# Patient Record
Sex: FEMALE | Race: WHITE | Employment: FULL TIME | URBAN - METROPOLITAN AREA
[De-identification: names, ages, dates, MRNs, and addresses within clinical notes are randomized per-mention and may not be internally consistent; named-entity substitution may affect disease eponyms.]

---

## 2017-01-24 RX ORDER — FAMCICLOVIR 500 MG/1
TABLET, FILM COATED ORAL
Qty: 30 TABLET | Refills: 3 | Status: SHIPPED | OUTPATIENT
Start: 2017-01-24 | End: 2017-03-02 | Stop reason: SDUPTHER

## 2017-03-02 RX ORDER — FAMCICLOVIR 500 MG/1
TABLET, FILM COATED ORAL
Qty: 30 TABLET | Refills: 0 | Status: SHIPPED | OUTPATIENT
Start: 2017-03-02 | End: 2018-05-16 | Stop reason: SDUPTHER

## 2017-03-02 RX ORDER — BUPROPION HYDROCHLORIDE 150 MG/1
150 TABLET, EXTENDED RELEASE ORAL 2 TIMES DAILY
Qty: 60 TABLET | Refills: 5 | Status: SHIPPED | OUTPATIENT
Start: 2017-03-02 | End: 2017-05-02 | Stop reason: SDUPTHER

## 2017-05-02 RX ORDER — BUPROPION HYDROCHLORIDE 150 MG/1
150 TABLET, EXTENDED RELEASE ORAL 2 TIMES DAILY
Qty: 60 TABLET | Refills: 5 | Status: SHIPPED | OUTPATIENT
Start: 2017-05-02 | End: 2018-01-30 | Stop reason: SDUPTHER

## 2018-01-02 ENCOUNTER — TELEPHONE (OUTPATIENT)
Dept: INTERNAL MEDICINE CLINIC | Age: 38
End: 2018-01-02

## 2018-01-02 DIAGNOSIS — M54.50 ACUTE MIDLINE LOW BACK PAIN WITHOUT SCIATICA: Primary | ICD-10-CM

## 2018-01-30 RX ORDER — BUPROPION HYDROCHLORIDE 150 MG/1
150 TABLET, EXTENDED RELEASE ORAL 2 TIMES DAILY
Qty: 60 TABLET | Refills: 5 | Status: SHIPPED | OUTPATIENT
Start: 2018-01-30 | End: 2018-02-12 | Stop reason: CLARIF

## 2018-01-30 NOTE — TELEPHONE ENCOUNTER
Chanel Seema called and wanted to know if you could change her Wellbutrin SR to Wellbutrin XL so she only has to take it once a day.     She will need 2 rx's one for 30 days at [facility] and one sent to [facility]

## 2018-02-06 ENCOUNTER — TELEPHONE (OUTPATIENT)
Dept: INTERNAL MEDICINE CLINIC | Age: 38
End: 2018-02-06

## 2018-02-06 DIAGNOSIS — Z00.00 ANNUAL PHYSICAL EXAM: Primary | ICD-10-CM

## 2018-02-06 DIAGNOSIS — E55.9 VITAMIN D DEFICIENCY: ICD-10-CM

## 2018-02-12 ENCOUNTER — OFFICE VISIT (OUTPATIENT)
Dept: INTERNAL MEDICINE CLINIC | Age: 38
End: 2018-02-12

## 2018-02-12 VITALS
HEART RATE: 72 BPM | WEIGHT: 151.4 LBS | SYSTOLIC BLOOD PRESSURE: 110 MMHG | BODY MASS INDEX: 24.33 KG/M2 | HEIGHT: 66 IN | DIASTOLIC BLOOD PRESSURE: 70 MMHG

## 2018-02-12 DIAGNOSIS — E55.9 VITAMIN D DEFICIENCY: ICD-10-CM

## 2018-02-12 DIAGNOSIS — Z00.00 ANNUAL PHYSICAL EXAM: Primary | ICD-10-CM

## 2018-02-12 DIAGNOSIS — Z00.00 ANNUAL PHYSICAL EXAM: ICD-10-CM

## 2018-02-12 LAB
A/G RATIO: 1.6 (ref 1.1–2.2)
ALBUMIN SERPL-MCNC: 4.3 G/DL (ref 3.4–5)
ALP BLD-CCNC: 59 U/L (ref 40–129)
ALT SERPL-CCNC: 14 U/L (ref 10–40)
ANION GAP SERPL CALCULATED.3IONS-SCNC: 12 MMOL/L (ref 3–16)
AST SERPL-CCNC: 17 U/L (ref 15–37)
BACTERIA: ABNORMAL /HPF
BASOPHILS ABSOLUTE: 0 K/UL (ref 0–0.2)
BASOPHILS RELATIVE PERCENT: 0.5 %
BILIRUB SERPL-MCNC: 0.4 MG/DL (ref 0–1)
BILIRUBIN URINE: NEGATIVE
BLOOD, URINE: NEGATIVE
BUN BLDV-MCNC: 10 MG/DL (ref 7–20)
CALCIUM SERPL-MCNC: 9.3 MG/DL (ref 8.3–10.6)
CHLORIDE BLD-SCNC: 102 MMOL/L (ref 99–110)
CHOLESTEROL, TOTAL: 182 MG/DL (ref 0–199)
CLARITY: CLEAR
CO2: 27 MMOL/L (ref 21–32)
COLOR: YELLOW
CREAT SERPL-MCNC: 0.8 MG/DL (ref 0.6–1.1)
EOSINOPHILS ABSOLUTE: 0.2 K/UL (ref 0–0.6)
EOSINOPHILS RELATIVE PERCENT: 2.6 %
EPITHELIAL CELLS, UA: 2 /HPF (ref 0–5)
GFR AFRICAN AMERICAN: >60
GFR NON-AFRICAN AMERICAN: >60
GLOBULIN: 2.7 G/DL
GLUCOSE BLD-MCNC: 87 MG/DL (ref 70–99)
GLUCOSE URINE: NEGATIVE MG/DL
HCT VFR BLD CALC: 37.3 % (ref 36–48)
HDLC SERPL-MCNC: 76 MG/DL (ref 40–60)
HEMOGLOBIN: 12.8 G/DL (ref 12–16)
HYALINE CASTS: 0 /LPF (ref 0–8)
KETONES, URINE: NEGATIVE MG/DL
LDL CHOLESTEROL CALCULATED: 93 MG/DL
LEUKOCYTE ESTERASE, URINE: NEGATIVE
LYMPHOCYTES ABSOLUTE: 1.3 K/UL (ref 1–5.1)
LYMPHOCYTES RELATIVE PERCENT: 19 %
MCH RBC QN AUTO: 32.3 PG (ref 26–34)
MCHC RBC AUTO-ENTMCNC: 34.2 G/DL (ref 31–36)
MCV RBC AUTO: 94.4 FL (ref 80–100)
MICROSCOPIC EXAMINATION: ABNORMAL
MONOCYTES ABSOLUTE: 0.5 K/UL (ref 0–1.3)
MONOCYTES RELATIVE PERCENT: 6.7 %
NEUTROPHILS ABSOLUTE: 4.9 K/UL (ref 1.7–7.7)
NEUTROPHILS RELATIVE PERCENT: 71.2 %
NITRITE, URINE: NEGATIVE
PDW BLD-RTO: 12.9 % (ref 12.4–15.4)
PH UA: 6.5
PLATELET # BLD: 269 K/UL (ref 135–450)
PMV BLD AUTO: 8.7 FL (ref 5–10.5)
POTASSIUM SERPL-SCNC: 4.8 MMOL/L (ref 3.5–5.1)
PROTEIN UA: NEGATIVE MG/DL
RBC # BLD: 3.95 M/UL (ref 4–5.2)
RBC UA: 3 /HPF (ref 0–4)
SODIUM BLD-SCNC: 141 MMOL/L (ref 136–145)
SPECIFIC GRAVITY UA: 1.01
TOTAL PROTEIN: 7 G/DL (ref 6.4–8.2)
TRIGL SERPL-MCNC: 66 MG/DL (ref 0–150)
TSH SERPL DL<=0.05 MIU/L-ACNC: 1.69 UIU/ML (ref 0.27–4.2)
UROBILINOGEN, URINE: 0.2 E.U./DL
VITAMIN D 25-HYDROXY: 38.8 NG/ML
VLDLC SERPL CALC-MCNC: 13 MG/DL
WBC # BLD: 6.9 K/UL (ref 4–11)
WBC UA: 0 /HPF (ref 0–5)

## 2018-02-12 PROCEDURE — 99395 PREV VISIT EST AGE 18-39: CPT | Performed by: INTERNAL MEDICINE

## 2018-02-12 RX ORDER — BUPROPION HYDROCHLORIDE 300 MG/1
300 TABLET ORAL EVERY MORNING
Qty: 30 TABLET | Refills: 5 | Status: SHIPPED | OUTPATIENT
Start: 2018-02-12 | End: 2018-10-01 | Stop reason: SDUPTHER

## 2018-02-12 ASSESSMENT — PATIENT HEALTH QUESTIONNAIRE - PHQ9
SUM OF ALL RESPONSES TO PHQ9 QUESTIONS 1 & 2: 2
2. FEELING DOWN, DEPRESSED OR HOPELESS: 1
SUM OF ALL RESPONSES TO PHQ QUESTIONS 1-9: 2
1. LITTLE INTEREST OR PLEASURE IN DOING THINGS: 1

## 2018-02-13 LAB
ESTIMATED AVERAGE GLUCOSE: 79.6 MG/DL
HBA1C MFR BLD: 4.4 %

## 2018-05-16 RX ORDER — FAMCICLOVIR 500 MG/1
TABLET, FILM COATED ORAL
Qty: 30 TABLET | Refills: 0 | Status: SHIPPED | OUTPATIENT
Start: 2018-05-16 | End: 2019-03-12 | Stop reason: SDUPTHER

## 2018-10-03 RX ORDER — BUPROPION HYDROCHLORIDE 300 MG/1
TABLET ORAL
Qty: 30 TABLET | Refills: 4 | Status: SHIPPED | OUTPATIENT
Start: 2018-10-03 | End: 2019-03-12 | Stop reason: SDUPTHER

## 2018-12-04 ENCOUNTER — TELEPHONE (OUTPATIENT)
Dept: INTERNAL MEDICINE CLINIC | Age: 38
End: 2018-12-04

## 2018-12-04 RX ORDER — AZITHROMYCIN 250 MG/1
TABLET, FILM COATED ORAL
Qty: 1 PACKET | Refills: 0 | Status: SHIPPED | OUTPATIENT
Start: 2018-12-04 | End: 2018-12-14

## 2019-01-08 ENCOUNTER — TELEPHONE (OUTPATIENT)
Dept: INTERNAL MEDICINE CLINIC | Age: 39
End: 2019-01-08

## 2019-01-08 RX ORDER — AMOXICILLIN AND CLAVULANATE POTASSIUM 875; 125 MG/1; MG/1
1 TABLET, FILM COATED ORAL 2 TIMES DAILY
Qty: 20 TABLET | Refills: 0 | Status: SHIPPED | OUTPATIENT
Start: 2019-01-08 | End: 2019-01-18

## 2019-03-12 ENCOUNTER — OFFICE VISIT (OUTPATIENT)
Dept: INTERNAL MEDICINE CLINIC | Age: 39
End: 2019-03-12
Payer: COMMERCIAL

## 2019-03-12 VITALS
WEIGHT: 154.2 LBS | DIASTOLIC BLOOD PRESSURE: 68 MMHG | BODY MASS INDEX: 24.78 KG/M2 | HEIGHT: 66 IN | SYSTOLIC BLOOD PRESSURE: 110 MMHG | HEART RATE: 88 BPM

## 2019-03-12 DIAGNOSIS — Z23 NEED FOR PROPHYLACTIC VACCINATION AGAINST DIPHTHERIA-TETANUS-PERTUSSIS (DTP): ICD-10-CM

## 2019-03-12 DIAGNOSIS — Z12.83 SKIN CANCER SCREENING: ICD-10-CM

## 2019-03-12 DIAGNOSIS — Z00.00 ANNUAL PHYSICAL EXAM: Primary | ICD-10-CM

## 2019-03-12 LAB
A/G RATIO: 1.8 (ref 1.1–2.2)
ALBUMIN SERPL-MCNC: 4.8 G/DL (ref 3.4–5)
ALP BLD-CCNC: 55 U/L (ref 40–129)
ALT SERPL-CCNC: 15 U/L (ref 10–40)
ANION GAP SERPL CALCULATED.3IONS-SCNC: 13 MMOL/L (ref 3–16)
AST SERPL-CCNC: 19 U/L (ref 15–37)
BASOPHILS ABSOLUTE: 0 K/UL (ref 0–0.2)
BASOPHILS RELATIVE PERCENT: 0.4 %
BILIRUB SERPL-MCNC: 0.6 MG/DL (ref 0–1)
BILIRUBIN URINE: NEGATIVE
BLOOD, URINE: NEGATIVE
BUN BLDV-MCNC: 11 MG/DL (ref 7–20)
CALCIUM SERPL-MCNC: 9.4 MG/DL (ref 8.3–10.6)
CHLORIDE BLD-SCNC: 101 MMOL/L (ref 99–110)
CHOLESTEROL, TOTAL: 197 MG/DL (ref 0–199)
CLARITY: CLEAR
CO2: 26 MMOL/L (ref 21–32)
COLOR: YELLOW
CREAT SERPL-MCNC: 0.6 MG/DL (ref 0.6–1.1)
EOSINOPHILS ABSOLUTE: 0.1 K/UL (ref 0–0.6)
EOSINOPHILS RELATIVE PERCENT: 2.1 %
EPITHELIAL CELLS, UA: 2 /HPF (ref 0–5)
GFR AFRICAN AMERICAN: >60
GFR NON-AFRICAN AMERICAN: >60
GLOBULIN: 2.6 G/DL
GLUCOSE BLD-MCNC: 102 MG/DL (ref 70–99)
GLUCOSE URINE: NEGATIVE MG/DL
HCT VFR BLD CALC: 40.3 % (ref 36–48)
HDLC SERPL-MCNC: 76 MG/DL (ref 40–60)
HEMOGLOBIN: 13.6 G/DL (ref 12–16)
HYALINE CASTS: 1 /LPF (ref 0–8)
KETONES, URINE: NEGATIVE MG/DL
LDL CHOLESTEROL CALCULATED: 104 MG/DL
LEUKOCYTE ESTERASE, URINE: NEGATIVE
LYMPHOCYTES ABSOLUTE: 1.9 K/UL (ref 1–5.1)
LYMPHOCYTES RELATIVE PERCENT: 26.8 %
MCH RBC QN AUTO: 32 PG (ref 26–34)
MCHC RBC AUTO-ENTMCNC: 33.8 G/DL (ref 31–36)
MCV RBC AUTO: 94.9 FL (ref 80–100)
MICROSCOPIC EXAMINATION: NORMAL
MONOCYTES ABSOLUTE: 0.6 K/UL (ref 0–1.3)
MONOCYTES RELATIVE PERCENT: 7.7 %
NEUTROPHILS ABSOLUTE: 4.6 K/UL (ref 1.7–7.7)
NEUTROPHILS RELATIVE PERCENT: 63 %
NITRITE, URINE: NEGATIVE
PDW BLD-RTO: 12.6 % (ref 12.4–15.4)
PH UA: 6 (ref 5–8)
PLATELET # BLD: 294 K/UL (ref 135–450)
PMV BLD AUTO: 9.7 FL (ref 5–10.5)
POTASSIUM SERPL-SCNC: 4.1 MMOL/L (ref 3.5–5.1)
PROTEIN UA: NEGATIVE MG/DL
RBC # BLD: 4.25 M/UL (ref 4–5.2)
RBC UA: NORMAL /HPF (ref 0–2)
SODIUM BLD-SCNC: 140 MMOL/L (ref 136–145)
SPECIFIC GRAVITY UA: 1.02 (ref 1–1.03)
TOTAL PROTEIN: 7.4 G/DL (ref 6.4–8.2)
TRIGL SERPL-MCNC: 86 MG/DL (ref 0–150)
TSH SERPL DL<=0.05 MIU/L-ACNC: 1.55 UIU/ML (ref 0.27–4.2)
UROBILINOGEN, URINE: 0.2 E.U./DL
VLDLC SERPL CALC-MCNC: 17 MG/DL
WBC # BLD: 7.2 K/UL (ref 4–11)
WBC UA: 1 /HPF (ref 0–5)

## 2019-03-12 PROCEDURE — 81001 URINALYSIS AUTO W/SCOPE: CPT | Performed by: INTERNAL MEDICINE

## 2019-03-12 PROCEDURE — 99395 PREV VISIT EST AGE 18-39: CPT | Performed by: INTERNAL MEDICINE

## 2019-03-12 PROCEDURE — 90715 TDAP VACCINE 7 YRS/> IM: CPT | Performed by: INTERNAL MEDICINE

## 2019-03-12 PROCEDURE — 90471 IMMUNIZATION ADMIN: CPT | Performed by: INTERNAL MEDICINE

## 2019-03-12 RX ORDER — FAMCICLOVIR 500 MG/1
TABLET, FILM COATED ORAL
Qty: 9 TABLET | Refills: 5 | Status: SHIPPED | OUTPATIENT
Start: 2019-03-12 | End: 2019-12-16 | Stop reason: SDUPTHER

## 2019-03-12 RX ORDER — BUPROPION HYDROCHLORIDE 300 MG/1
TABLET ORAL
Qty: 90 TABLET | Refills: 3 | Status: SHIPPED | OUTPATIENT
Start: 2019-03-12 | End: 2019-07-08 | Stop reason: SDUPTHER

## 2019-03-12 ASSESSMENT — PATIENT HEALTH QUESTIONNAIRE - PHQ9
2. FEELING DOWN, DEPRESSED OR HOPELESS: 0
SUM OF ALL RESPONSES TO PHQ QUESTIONS 1-9: 0
1. LITTLE INTEREST OR PLEASURE IN DOING THINGS: 0
SUM OF ALL RESPONSES TO PHQ9 QUESTIONS 1 & 2: 0
SUM OF ALL RESPONSES TO PHQ QUESTIONS 1-9: 0

## 2019-05-14 ENCOUNTER — TELEPHONE (OUTPATIENT)
Dept: INTERNAL MEDICINE CLINIC | Age: 39
End: 2019-05-14

## 2019-05-14 NOTE — TELEPHONE ENCOUNTER
Patient left message stating that she is currently on Wellbutrin 300mg and she would like to try to wean herself off. She is wanting PCP's suggestion. Also when c/b to pt we need to give her the number to Dr Aleena Landa; they never called her to schedule.  [phone]

## 2019-05-16 NOTE — TELEPHONE ENCOUNTER
Can change to Wellbutrin  mg 1 qday #30 RX1 and call results after two weeks. OK to give Dr Ana Paula Issa phone #, can change to Dr Marcos Villareal in [place] if no luck in contacting.

## 2019-06-21 ENCOUNTER — TELEPHONE (OUTPATIENT)
Dept: INTERNAL MEDICINE CLINIC | Age: 39
End: 2019-06-21

## 2019-06-21 NOTE — TELEPHONE ENCOUNTER
Patient left message stating she has injured her left lower leg somehow. She stated she walks 6.5 miles 6 days a week. She stated she knows she has shin splints in both legs but her right leg is not nearly as bad as her left. Also in her left leg; she's having calf pain. She state there is no swelling to her calf and it is not warm to touch. She has been trying Ibuprofen and a gel on her leg but nothing seems to be helping. She is wanting PCP's advice. Please advise.

## 2019-07-08 RX ORDER — BUPROPION HYDROCHLORIDE 150 MG/1
TABLET ORAL
Qty: 90 TABLET | Refills: 1 | Status: SHIPPED | OUTPATIENT
Start: 2019-07-08 | End: 2019-12-16 | Stop reason: SDUPTHER

## 2019-07-08 NOTE — TELEPHONE ENCOUNTER
Last time Rey La was in you told her she could decrease her Wellbutrin to 150mg.   She would like this rx called in for 90 days to [facility]

## 2019-09-05 ENCOUNTER — TELEPHONE (OUTPATIENT)
Dept: INTERNAL MEDICINE CLINIC | Age: 39
End: 2019-09-05

## 2019-09-05 RX ORDER — HYDROXYZINE HYDROCHLORIDE 10 MG/1
TABLET, FILM COATED ORAL
Qty: 50 TABLET | Refills: 1 | Status: SHIPPED | OUTPATIENT
Start: 2019-09-05 | End: 2020-08-10

## 2019-09-06 ENCOUNTER — TELEPHONE (OUTPATIENT)
Dept: INTERNAL MEDICINE CLINIC | Age: 39
End: 2019-09-06

## 2019-09-06 DIAGNOSIS — Z12.83 SKIN CANCER SCREENING: Primary | ICD-10-CM

## 2019-09-09 NOTE — TELEPHONE ENCOUNTER
She can change the Wellbutrin  mg to every other day for 10 days then stop.     Refer to Masha Quijano with Dr Papo Mueller

## 2019-11-06 ENCOUNTER — TELEPHONE (OUTPATIENT)
Dept: INTERNAL MEDICINE CLINIC | Age: 39
End: 2019-11-06

## 2019-12-16 RX ORDER — BUPROPION HYDROCHLORIDE 300 MG/1
TABLET ORAL
Qty: 90 TABLET | Refills: 3 | Status: SHIPPED | OUTPATIENT
Start: 2019-12-16 | End: 2020-08-25

## 2019-12-16 RX ORDER — FAMCICLOVIR 500 MG/1
TABLET, FILM COATED ORAL
Qty: 9 TABLET | Refills: 5 | Status: SHIPPED | OUTPATIENT
Start: 2019-12-16 | End: 2020-09-24 | Stop reason: SDUPTHER

## 2020-05-07 ENCOUNTER — HOSPITAL ENCOUNTER (OUTPATIENT)
Age: 40
Setting detail: SPECIMEN
Discharge: HOME | End: 2020-05-07

## 2020-05-07 PROCEDURE — U0002 COVID-19 LAB TEST NON-CDC: HCPCS

## 2020-05-10 LAB
SARS-COV-2: NOT DETECTED
SOURCE: NORMAL

## 2020-08-10 ENCOUNTER — HOSPITAL ENCOUNTER (OUTPATIENT)
Age: 40
Discharge: HOME | End: 2020-08-10
Payer: COMMERCIAL

## 2020-08-10 ENCOUNTER — TELEPHONE (OUTPATIENT)
Dept: FAMILY MEDICINE CLINIC | Age: 40
End: 2020-08-10

## 2020-08-10 ENCOUNTER — HOSPITAL ENCOUNTER (OUTPATIENT)
Dept: GENERAL RADIOLOGY | Age: 40
Discharge: HOME | End: 2020-08-10
Payer: COMMERCIAL

## 2020-08-10 ENCOUNTER — OFFICE VISIT (OUTPATIENT)
Dept: FAMILY MEDICINE CLINIC | Age: 40
End: 2020-08-10
Payer: COMMERCIAL

## 2020-08-10 VITALS
WEIGHT: 154.4 LBS | HEART RATE: 80 BPM | SYSTOLIC BLOOD PRESSURE: 108 MMHG | BODY MASS INDEX: 24.92 KG/M2 | TEMPERATURE: 96.9 F | DIASTOLIC BLOOD PRESSURE: 78 MMHG | OXYGEN SATURATION: 100 %

## 2020-08-10 LAB — D DIMER: <200 NG/ML(DDU)

## 2020-08-10 PROCEDURE — 36415 COLL VENOUS BLD VENIPUNCTURE: CPT

## 2020-08-10 PROCEDURE — 85379 FIBRIN DEGRADATION QUANT: CPT

## 2020-08-10 PROCEDURE — 93000 ELECTROCARDIOGRAM COMPLETE: CPT | Performed by: NURSE PRACTITIONER

## 2020-08-10 PROCEDURE — 71101 X-RAY EXAM UNILAT RIBS/CHEST: CPT

## 2020-08-10 PROCEDURE — 99213 OFFICE O/P EST LOW 20 MIN: CPT | Performed by: NURSE PRACTITIONER

## 2020-08-10 RX ORDER — NAPROXEN 500 MG/1
500 TABLET ORAL 2 TIMES DAILY WITH MEALS
Qty: 60 TABLET | Refills: 0 | Status: SHIPPED | OUTPATIENT
Start: 2020-08-10 | End: 2020-08-25

## 2020-08-10 SDOH — HEALTH STABILITY: MENTAL HEALTH: HOW OFTEN DO YOU HAVE A DRINK CONTAINING ALCOHOL?: MONTHLY OR LESS

## 2020-08-10 ASSESSMENT — ENCOUNTER SYMPTOMS
VOMITING: 0
SHORTNESS OF BREATH: 0
WHEEZING: 0
CHEST TIGHTNESS: 0
NAUSEA: 0
DIARRHEA: 0
COUGH: 0

## 2020-08-10 NOTE — PROGRESS NOTES
Component Value Date    LDLCALC 104 (H) 03/12/2019    LDLCALC 93 02/12/2018     Lab Results   Component Value Date    LABA1C 4.4 02/12/2018     Lab Results   Component Value Date    TSH 1.55 03/12/2019    TSHHS 1.470 08/07/2014         ASSESSMENT/PLAN:      1. Chest pain, unspecified type  EKG showed normal sinus rhythm. Likely musculoskeletal.  Will order x-ray of the ribs to include the chest.  Will check D-Dimer. Advised patient if any of her symptoms worsen to go to the emergency room. - EKG 12 Lead  - XR RIBS RIGHT INCLUDE CHEST (MIN 3 VIEWS); Future  - D-DIMER, QUANTITATIVE; Future    2. Rib pain on right side  See above. - XR RIBS RIGHT INCLUDE CHEST (MIN 3 VIEWS); Future        No orders of the defined types were placed in this encounter. Medications Discontinued During This Encounter   Medication Reason    hydrOXYzine (ATARAX) 10 MG tablet LIST CLEANUP           Care discussed with patient. Questions answered. Patient verbalizes understanding and agrees with plan. After visit summary provided. Advised to call for any problems, questions, or concerns. Return if symptoms worsen or fail to improve.                                              Signed:  CECI Gutierrez CNP  08/10/20  4:32 PM

## 2020-08-25 ENCOUNTER — OFFICE VISIT (OUTPATIENT)
Dept: INTERNAL MEDICINE CLINIC | Age: 40
End: 2020-08-25
Payer: COMMERCIAL

## 2020-08-25 VITALS
HEIGHT: 66 IN | TEMPERATURE: 98.5 F | OXYGEN SATURATION: 97 % | HEART RATE: 77 BPM | SYSTOLIC BLOOD PRESSURE: 122 MMHG | DIASTOLIC BLOOD PRESSURE: 82 MMHG | BODY MASS INDEX: 24.43 KG/M2 | WEIGHT: 152 LBS

## 2020-08-25 PROCEDURE — 99203 OFFICE O/P NEW LOW 30 MIN: CPT | Performed by: FAMILY MEDICINE

## 2020-08-25 RX ORDER — HYDROXYZINE HYDROCHLORIDE 10 MG/1
10 TABLET, FILM COATED ORAL 3 TIMES DAILY PRN
Qty: 90 TABLET | Refills: 0 | Status: SHIPPED | OUTPATIENT
Start: 2020-08-25 | End: 2020-09-04

## 2020-08-25 RX ORDER — BUPROPION HYDROCHLORIDE 150 MG/1
150 TABLET ORAL EVERY MORNING
Qty: 30 TABLET | Refills: 0 | Status: SHIPPED | OUTPATIENT
Start: 2020-08-25 | End: 2020-09-24

## 2020-08-25 ASSESSMENT — ENCOUNTER SYMPTOMS
COUGH: 0
BACK PAIN: 0
ABDOMINAL PAIN: 0
BLOOD IN STOOL: 0
NAUSEA: 0
DIARRHEA: 0
CONSTIPATION: 0
SHORTNESS OF BREATH: 0

## 2020-08-25 ASSESSMENT — PATIENT HEALTH QUESTIONNAIRE - PHQ9
SUM OF ALL RESPONSES TO PHQ QUESTIONS 1-9: 0
SUM OF ALL RESPONSES TO PHQ9 QUESTIONS 1 & 2: 0
1. LITTLE INTEREST OR PLEASURE IN DOING THINGS: 0
SUM OF ALL RESPONSES TO PHQ QUESTIONS 1-9: 0
2. FEELING DOWN, DEPRESSED OR HOPELESS: 0

## 2020-08-25 NOTE — PROGRESS NOTES
ibuprofen (ADVIL;MOTRIN) 600 MG tablet Take 1 tablet by mouth every 8 hours as needed for Pain. 60 tablet 0    multivitamin (THERAGRAN) per tablet Take 1 tablet by mouth 2 times daily. No current facility-administered medications for this visit. OBJECTIVE:    /82   Pulse 77   Temp 98.5 °F (36.9 °C)   Ht 5' 6\" (1.676 m)   Wt 152 lb (68.9 kg)   SpO2 97%   BMI 24.53 kg/m²     Physical Exam  Vitals signs reviewed. Constitutional:       General: She is not in acute distress. Appearance: She is well-developed. HENT:      Head: Normocephalic and atraumatic. Right Ear: Tympanic membrane normal.      Left Ear: Tympanic membrane normal.      Nose: Nose normal.      Mouth/Throat:      Mouth: Mucous membranes are moist.   Eyes:      Extraocular Movements: Extraocular movements intact. Pupils: Pupils are equal, round, and reactive to light. Neck:      Musculoskeletal: Neck supple. Cardiovascular:      Rate and Rhythm: Normal rate and regular rhythm. Pulmonary:      Effort: Pulmonary effort is normal. No respiratory distress. Breath sounds: Normal breath sounds. Abdominal:      General: Bowel sounds are normal. There is no distension. Palpations: Abdomen is soft. Tenderness: There is no abdominal tenderness. Musculoskeletal: Normal range of motion. Right lower leg: No edema. Left lower leg: No edema. Skin:     General: Skin is warm and dry. Neurological:      Mental Status: She is alert and oriented to person, place, and time. Cranial Nerves: No cranial nerve deficit. Psychiatric:         Mood and Affect: Mood normal.         ASSESSMENT:  1. Anxiety    2.  Irritable bowel syndrome, unspecified type        PLAN:  Orders Placed This Encounter   Medications    hydrOXYzine (ATARAX) 10 MG tablet     Sig: Take 1 tablet by mouth 3 times daily as needed for Anxiety     Dispense:  90 tablet     Refill:  0    buPROPion (WELLBUTRIN XL) 150 MG extended

## 2020-09-24 ENCOUNTER — OFFICE VISIT (OUTPATIENT)
Dept: INTERNAL MEDICINE CLINIC | Age: 40
End: 2020-09-24
Payer: COMMERCIAL

## 2020-09-24 VITALS
WEIGHT: 158.4 LBS | OXYGEN SATURATION: 99 % | SYSTOLIC BLOOD PRESSURE: 104 MMHG | HEIGHT: 66 IN | DIASTOLIC BLOOD PRESSURE: 68 MMHG | HEART RATE: 61 BPM | BODY MASS INDEX: 25.46 KG/M2 | TEMPERATURE: 98.2 F

## 2020-09-24 LAB
A/G RATIO: 1.9 (ref 1.1–2.2)
ALBUMIN SERPL-MCNC: 4.3 G/DL (ref 3.4–5)
ALP BLD-CCNC: 62 U/L (ref 40–129)
ALT SERPL-CCNC: 11 U/L (ref 10–40)
ANION GAP SERPL CALCULATED.3IONS-SCNC: 12 MMOL/L (ref 3–16)
AST SERPL-CCNC: 18 U/L (ref 15–37)
BASOPHILS ABSOLUTE: 0 K/UL (ref 0–0.2)
BASOPHILS RELATIVE PERCENT: 0.5 %
BILIRUB SERPL-MCNC: 0.8 MG/DL (ref 0–1)
BUN BLDV-MCNC: 10 MG/DL (ref 7–20)
CALCIUM SERPL-MCNC: 9.1 MG/DL (ref 8.3–10.6)
CHLORIDE BLD-SCNC: 105 MMOL/L (ref 99–110)
CHOLESTEROL, TOTAL: 188 MG/DL (ref 0–199)
CO2: 23 MMOL/L (ref 21–32)
CREAT SERPL-MCNC: 0.6 MG/DL (ref 0.6–1.1)
EOSINOPHILS ABSOLUTE: 0.2 K/UL (ref 0–0.6)
EOSINOPHILS RELATIVE PERCENT: 2.6 %
GFR AFRICAN AMERICAN: >60
GFR NON-AFRICAN AMERICAN: >60
GLOBULIN: 2.3 G/DL
GLUCOSE BLD-MCNC: 81 MG/DL (ref 70–99)
HCT VFR BLD CALC: 36.5 % (ref 36–48)
HDLC SERPL-MCNC: 87 MG/DL (ref 40–60)
HEMOGLOBIN: 12.2 G/DL (ref 12–16)
LDL CHOLESTEROL CALCULATED: 90 MG/DL
LYMPHOCYTES ABSOLUTE: 2.2 K/UL (ref 1–5.1)
LYMPHOCYTES RELATIVE PERCENT: 29 %
MCH RBC QN AUTO: 32 PG (ref 26–34)
MCHC RBC AUTO-ENTMCNC: 33.3 G/DL (ref 31–36)
MCV RBC AUTO: 96.1 FL (ref 80–100)
MONOCYTES ABSOLUTE: 0.5 K/UL (ref 0–1.3)
MONOCYTES RELATIVE PERCENT: 6.7 %
NEUTROPHILS ABSOLUTE: 4.7 K/UL (ref 1.7–7.7)
NEUTROPHILS RELATIVE PERCENT: 61.2 %
PDW BLD-RTO: 12.9 % (ref 12.4–15.4)
PLATELET # BLD: 257 K/UL (ref 135–450)
PMV BLD AUTO: 9.5 FL (ref 5–10.5)
POTASSIUM SERPL-SCNC: 4 MMOL/L (ref 3.5–5.1)
RBC # BLD: 3.8 M/UL (ref 4–5.2)
SODIUM BLD-SCNC: 140 MMOL/L (ref 136–145)
TOTAL PROTEIN: 6.6 G/DL (ref 6.4–8.2)
TRIGL SERPL-MCNC: 53 MG/DL (ref 0–150)
TSH REFLEX FT4: 1.36 UIU/ML (ref 0.27–4.2)
VLDLC SERPL CALC-MCNC: 11 MG/DL
WBC # BLD: 7.6 K/UL (ref 4–11)

## 2020-09-24 PROCEDURE — 36415 COLL VENOUS BLD VENIPUNCTURE: CPT | Performed by: FAMILY MEDICINE

## 2020-09-24 PROCEDURE — 99395 PREV VISIT EST AGE 18-39: CPT | Performed by: FAMILY MEDICINE

## 2020-09-24 RX ORDER — DULOXETIN HYDROCHLORIDE 30 MG/1
30 CAPSULE, DELAYED RELEASE ORAL DAILY
Qty: 30 CAPSULE | Refills: 0 | Status: SHIPPED | OUTPATIENT
Start: 2020-09-24 | End: 2021-04-16 | Stop reason: SDUPTHER

## 2020-09-24 RX ORDER — LUBIPROSTONE 8 UG/1
8 CAPSULE, GELATIN COATED ORAL DAILY
Qty: 30 CAPSULE | Refills: 2 | Status: SHIPPED | OUTPATIENT
Start: 2020-09-24 | End: 2021-11-04

## 2020-09-24 RX ORDER — HYDROXYZINE HYDROCHLORIDE 25 MG/1
25 TABLET, FILM COATED ORAL 3 TIMES DAILY PRN
COMMUNITY
End: 2021-07-28

## 2020-09-24 RX ORDER — FAMCICLOVIR 500 MG/1
TABLET, FILM COATED ORAL
Qty: 9 TABLET | Refills: 5 | Status: SHIPPED | OUTPATIENT
Start: 2020-09-24 | End: 2022-01-25 | Stop reason: SDUPTHER

## 2020-09-24 ASSESSMENT — ENCOUNTER SYMPTOMS
SHORTNESS OF BREATH: 0
CHEST TIGHTNESS: 0
ABDOMINAL PAIN: 0
EYES NEGATIVE: 1
DIARRHEA: 0
COUGH: 0
BLOOD IN STOOL: 0
NAUSEA: 0
BACK PAIN: 0

## 2020-09-24 NOTE — PROGRESS NOTES
Suzanne Dotson  1980  47 y.o.  female    Chief Complaint   Patient presents with    Annual Exam         History of Present Illness  Suzanne Dotson is a 47 y.o. female who presents today for an annual exam. Pt has chronic anxiety and is ready to start Cymbalta. She is off of Wellbutrin. She has IBS-C and she has no help with Miralax and Colace. She has an external hemorrhoid that she states is improving, but constipation irritates. She is UTD with paps with Carrol Crain. Luan-Np. She will get Lipo Cristo with [facility]. She will also have vitamin infusions. Review of Systems   Constitutional: Positive for fatigue. Negative for chills, diaphoresis and fever. HENT: Negative. Eyes: Negative. Respiratory: Negative for cough, chest tightness and shortness of breath. Cardiovascular: Negative for chest pain and palpitations. Gastrointestinal: Positive for constipation. Negative for abdominal pain, blood in stool, diarrhea and nausea. Genitourinary: Negative for difficulty urinating and dysuria. Musculoskeletal: Negative for back pain and neck pain. Skin: Negative. Neurological: Negative for dizziness, light-headedness and headaches.    Psychiatric/Behavioral:        Anxiety       Allergies   Allergen Reactions    Sulfa Antibiotics        Past Medical History:   Diagnosis Date    Anxiety     IBS (irritable bowel syndrome)        Past Surgical History:   Procedure Laterality Date    HYSTERECTOMY      partial; still has ovaries       Family History   Problem Relation Age of Onset    Other Mother         endometriosis, cyst       Social History     Tobacco Use    Smoking status: Former Smoker    Smokeless tobacco: Never Used   Substance Use Topics    Alcohol use: Yes     Frequency: Monthly or less     Comment: socially    Drug use: Not Currently       Current Outpatient Medications   Medication Sig Dispense Refill    hydrOXYzine (ATARAX) 25 MG tablet Take 25 mg by mouth 3 times daily as needed for Itching      famciclovir (FAMVIR) 500 MG tablet Take three tablets for one dose as needed. 9 tablet 5    DULoxetine (CYMBALTA) 30 MG extended release capsule Take 1 capsule by mouth daily 30 capsule 0    lubiprostone (AMITIZA) 8 MCG CAPS capsule Take 1 capsule by mouth daily 30 capsule 2    ibuprofen (ADVIL;MOTRIN) 600 MG tablet Take 1 tablet by mouth every 8 hours as needed for Pain. 60 tablet 0    multivitamin (THERAGRAN) per tablet Take 1 tablet by mouth 2 times daily. No current facility-administered medications for this visit. OBJECTIVE:    /68   Pulse 61   Temp 98.2 °F (36.8 °C)   Ht 5' 6\" (1.676 m)   Wt 158 lb 6.4 oz (71.8 kg)   SpO2 99%   BMI 25.57 kg/m²     Physical Exam  Vitals signs reviewed. Constitutional:       General: She is not in acute distress. Appearance: She is well-developed. HENT:      Head: Normocephalic and atraumatic. Right Ear: Tympanic membrane normal.      Left Ear: Tympanic membrane normal.      Nose: Nose normal.      Mouth/Throat:      Mouth: Mucous membranes are moist.   Eyes:      Extraocular Movements: Extraocular movements intact. Pupils: Pupils are equal, round, and reactive to light. Neck:      Musculoskeletal: Neck supple. Cardiovascular:      Rate and Rhythm: Normal rate and regular rhythm. Heart sounds: Normal heart sounds. Pulmonary:      Effort: Pulmonary effort is normal. No respiratory distress. Breath sounds: Normal breath sounds. Abdominal:      General: Bowel sounds are normal. There is no distension. Palpations: Abdomen is soft. Tenderness: There is no abdominal tenderness. Musculoskeletal: Normal range of motion. Right lower leg: No edema. Left lower leg: No edema. Skin:     General: Skin is warm and dry. Neurological:      Mental Status: She is alert and oriented to person, place, and time. Cranial Nerves: No cranial nerve deficit.    Psychiatric:         Mood and Affect: Mood normal.         ASSESSMENT:  1. Annual physical exam    2. Anxiety    3. Irritable bowel syndrome with constipation    4. Screening cholesterol level        PLAN:    Orders Placed This Encounter   Procedures    CBC Auto Differential    Comprehensive Metabolic Panel    Lipid Panel    TSH WITH REFLEX TO FT4       Orders Placed This Encounter   Medications    famciclovir (FAMVIR) 500 MG tablet     Sig: Take three tablets for one dose as needed. Dispense:  9 tablet     Refill:  5    DULoxetine (CYMBALTA) 30 MG extended release capsule     Sig: Take 1 capsule by mouth daily     Dispense:  30 capsule     Refill:  0    lubiprostone (AMITIZA) 8 MCG CAPS capsule     Sig: Take 1 capsule by mouth daily     Dispense:  30 capsule     Refill:  2   Obtain lab today  Start Cymbalta as she is off of Wellbutrin. She will call to increase  Continue Famvir  Trial of Amitiza  ADR's explained  Keep f/u with Gyn         Return in about 3 months (around 12/24/2020) for anxiety.     Electronically Signed by Ricardo Arriaga DO

## 2020-10-03 ASSESSMENT — ENCOUNTER SYMPTOMS: CONSTIPATION: 1

## 2021-04-16 RX ORDER — DULOXETIN HYDROCHLORIDE 30 MG/1
30 CAPSULE, DELAYED RELEASE ORAL DAILY
Qty: 30 CAPSULE | Refills: 0 | Status: SHIPPED | OUTPATIENT
Start: 2021-04-16 | End: 2021-06-01 | Stop reason: SDUPTHER

## 2021-04-16 NOTE — TELEPHONE ENCOUNTER
Pt left VM on MA line, requesting new Rx for Cymbalta, to [facility]. She states she never filled Rx, she only used PRN (Atarax). She tried to fill Cymbalta, but [facility] told her Rx had , and she requires new Rx. Last OV 20.

## 2021-06-28 ENCOUNTER — OFFICE VISIT (OUTPATIENT)
Dept: INTERNAL MEDICINE CLINIC | Age: 41
End: 2021-06-28
Payer: COMMERCIAL

## 2021-06-28 VITALS
HEART RATE: 69 BPM | SYSTOLIC BLOOD PRESSURE: 130 MMHG | WEIGHT: 168.8 LBS | DIASTOLIC BLOOD PRESSURE: 78 MMHG | OXYGEN SATURATION: 99 % | BODY MASS INDEX: 27.25 KG/M2

## 2021-06-28 DIAGNOSIS — G89.29 CHRONIC NECK PAIN: ICD-10-CM

## 2021-06-28 DIAGNOSIS — F41.9 ANXIETY: Primary | ICD-10-CM

## 2021-06-28 DIAGNOSIS — M54.2 CHRONIC NECK PAIN: ICD-10-CM

## 2021-06-28 PROCEDURE — 99213 OFFICE O/P EST LOW 20 MIN: CPT | Performed by: FAMILY MEDICINE

## 2021-06-28 RX ORDER — DULOXETIN HYDROCHLORIDE 60 MG/1
60 CAPSULE, DELAYED RELEASE ORAL DAILY
Qty: 30 CAPSULE | Refills: 5 | Status: SHIPPED | OUTPATIENT
Start: 2021-06-28 | End: 2021-07-28

## 2021-06-28 ASSESSMENT — ENCOUNTER SYMPTOMS
BACK PAIN: 0
NAUSEA: 0
COUGH: 0
ABDOMINAL PAIN: 0
SHORTNESS OF BREATH: 0
CHEST TIGHTNESS: 0

## 2021-06-28 NOTE — PROGRESS NOTES
Dannie Fields (:  1980) is a 46 y.o. female,Established patient, here for evaluation of the following chief complaint(s):  Follow-up and Medication Check (Cymbalta)         ASSESSMENT/PLAN:  1. Anxiety chronic-not controlled  Increase Cymbalta to 60 mg  -     DULoxetine (CYMBALTA) 60 MG extended release capsule; Take 1 capsule by mouth daily, Disp-30 capsule, R-5D/C  Cymbalta 30Normal  2. Chronic neck pain  Keep f/u with Dr Damian Price for pain management    Declines  Covid 19 vaccine  Persist RTO or call    On this date 2021 I have spent 20 minutes reviewing previous notes, test results and face to face with the patient discussing the diagnosis and importance of compliance with the treatment plan as well as documenting on the day of the visit. Return for Follow up  in 5 to 6  months for anxiety. Subjective   SUBJECTIVE/OBJECTIVE:  HPI: This 45 yo f here for the following:  Patient Active Problem List   Diagnosis    IBS (irritable bowel syndrome)    Anxiety disorder     -Anxiety- Pt with increased stressors. Symptoms not controlled on Cymbalta 30 mg  -Chronic neck pain- cervical disc bulging-aching in the shoulders. Pt had injections with Dr. Jelena Christina. Pt to get ablation with Dr. Damian Price and  TMJ injections. Review of Systems   Constitutional: Negative for diaphoresis and fever. Respiratory: Negative for cough, chest tightness and shortness of breath. Cardiovascular: Negative for chest pain and palpitations. Gastrointestinal: Negative for abdominal pain and nausea. Genitourinary: Negative for difficulty urinating. Musculoskeletal: Positive for neck pain (chronic). Negative for back pain. Neurological: Negative for dizziness and headaches. Psychiatric/Behavioral: Negative for dysphoric mood. Objective    Vitals:    21 1449   BP: 130/78   Pulse: 69   SpO2: 99%   Weight: 168 lb 12.8 oz (76.6 kg)       Physical Exam  Vitals reviewed.    Constitutional:       General: She is not in acute distress. Cardiovascular:      Rate and Rhythm: Normal rate and regular rhythm. Pulmonary:      Effort: Pulmonary effort is normal. No respiratory distress. Breath sounds: Normal breath sounds. Abdominal:      General: Bowel sounds are normal.      Palpations: Abdomen is soft. Tenderness: There is no abdominal tenderness. Musculoskeletal:      Cervical back: Neck supple. Right lower leg: No edema. Left lower leg: No edema. Neurological:      Mental Status: She is alert and oriented to person, place, and time. Cranial Nerves: No cranial nerve deficit. Psychiatric:         Mood and Affect: Mood normal.          An electronic signature was used to authenticate this note.     --Susana Gonzalez, DO

## 2021-07-28 ENCOUNTER — OFFICE VISIT (OUTPATIENT)
Dept: INTERNAL MEDICINE CLINIC | Age: 41
End: 2021-07-28
Payer: COMMERCIAL

## 2021-07-28 VITALS
WEIGHT: 162.4 LBS | DIASTOLIC BLOOD PRESSURE: 92 MMHG | OXYGEN SATURATION: 98 % | BODY MASS INDEX: 26.21 KG/M2 | SYSTOLIC BLOOD PRESSURE: 124 MMHG | HEART RATE: 76 BPM

## 2021-07-28 DIAGNOSIS — F51.01 PRIMARY INSOMNIA: ICD-10-CM

## 2021-07-28 DIAGNOSIS — F41.9 ANXIETY: ICD-10-CM

## 2021-07-28 DIAGNOSIS — I10 ESSENTIAL HYPERTENSION: Primary | ICD-10-CM

## 2021-07-28 DIAGNOSIS — F32.A DEPRESSION, UNSPECIFIED DEPRESSION TYPE: ICD-10-CM

## 2021-07-28 DIAGNOSIS — R00.2 PALPITATION: ICD-10-CM

## 2021-07-28 PROCEDURE — 93000 ELECTROCARDIOGRAM COMPLETE: CPT | Performed by: FAMILY MEDICINE

## 2021-07-28 PROCEDURE — 99214 OFFICE O/P EST MOD 30 MIN: CPT | Performed by: FAMILY MEDICINE

## 2021-07-28 RX ORDER — DESVENLAFAXINE 50 MG/1
50 TABLET, EXTENDED RELEASE ORAL DAILY
Qty: 30 TABLET | Refills: 3 | Status: SHIPPED | OUTPATIENT
Start: 2021-07-28 | End: 2021-09-29

## 2021-07-28 RX ORDER — METOPROLOL SUCCINATE 25 MG/1
25 TABLET, EXTENDED RELEASE ORAL DAILY
Qty: 30 TABLET | Refills: 3 | Status: SHIPPED | OUTPATIENT
Start: 2021-07-28 | End: 2021-11-04

## 2021-07-28 ASSESSMENT — PATIENT HEALTH QUESTIONNAIRE - PHQ9
SUM OF ALL RESPONSES TO PHQ QUESTIONS 1-9: 2
SUM OF ALL RESPONSES TO PHQ QUESTIONS 1-9: 2
SUM OF ALL RESPONSES TO PHQ9 QUESTIONS 1 & 2: 2
2. FEELING DOWN, DEPRESSED OR HOPELESS: 2
1. LITTLE INTEREST OR PLEASURE IN DOING THINGS: 0
SUM OF ALL RESPONSES TO PHQ QUESTIONS 1-9: 2

## 2021-07-28 ASSESSMENT — ENCOUNTER SYMPTOMS
NAUSEA: 0
ABDOMINAL PAIN: 0
BACK PAIN: 0
SHORTNESS OF BREATH: 0
COUGH: 0

## 2021-07-28 NOTE — PROGRESS NOTES
Thomas Prince (:  1980) is a 46 y.o. female,Established patient, here for evaluation of the following chief complaint(s):  Hypertension (had chest pain with it, pt has none now ) and Stress (wants to disuss, she has a lot of things going on right now )         ASSESSMENT/PLAN:  1. Essential hypertension new onset  Start Metoprolol  -     metoprolol succinate (TOPROL XL) 25 MG extended release tablet; Take 1 tablet by mouth daily, Disp-30 tablet, R-3Normal  2. Anxiety chronic  Start Pristiq  -     desvenlafaxine succinate (PRISTIQ) 50 MG TB24 extended release tablet; Take 1 tablet by mouth daily, Disp-30 tablet, R-3D/C CymbaltaNormal  3. Depression, unspecified depression type chronic  -     desvenlafaxine succinate (PRISTIQ) 50 MG TB24 extended release tablet; Take 1 tablet by mouth daily, Disp-30 tablet, R-3D/C CymbaltaNormal  4. Palpitation new problem  -     EKG 12 Lead  -     CBC Auto Differential; Future  -     Comprehensive Metabolic Panel; Future  -     TSH WITH REFLEX TO FT4; Future  5. Primary insomnia  chronic  Sleep hygiene    Consider counseling  Persist RTO or call. Worse ER  On this date 2021 I have spent 30 minutes reviewing previous notes, test results and face to face with the patient discussing the diagnosis and importance of compliance with the treatment plan as well as documenting on the day of the visit. Return in about 3 months (around 10/28/2021) for Depression and anxiety. Subjective   SUBJECTIVE/OBJECTIVE:  HPI: This  45 yo  f here for the following  Patient Active Problem List    Diagnosis Date Noted    Depression 2021    Essential hypertension 2021    IBS (irritable bowel syndrome) 2014    Anxiety disorder 2014     Anxiety and depression- Cymbalta  stopped due to insomnia. Hydroxyzine did not help with symptoms. She is going through a divorce. She will have to get a restraining order. She has 2 children.  She is tearful and it has been stressful . She may use behavior health through her employer. She has chronic insomnia  -HTN- Her BP has been elevated. She has several readings that have been elevated. She had a Cp episode that resolved. No Cp or Sob today  -Palpitations- symptoms chronic off and on . She does not know if related to her anxiety or other. No symptoms today    Review of Systems   Constitutional: Negative for diaphoresis and fever. HENT: Negative. Respiratory: Negative for cough and shortness of breath. Cardiovascular: Positive for palpitations (occasional off and on). Negative for chest pain. Gastrointestinal: Negative for abdominal pain, constipation, diarrhea and nausea. Genitourinary: Negative for difficulty urinating. Musculoskeletal: Negative for back pain. Neurological: Negative for dizziness and headaches. Psychiatric/Behavioral: Positive for dysphoric mood. Objective    Vitals:    07/28/21 1338 07/28/21 1348   BP: (!) 140/92 (!) 124/92   Site: Right Upper Arm    Position: Sitting    Cuff Size: Medium Adult    Pulse: 76    SpO2: 98%    Weight: 162 lb 6.4 oz (73.7 kg)        Physical Exam  Vitals reviewed. Constitutional:       General: She is not in acute distress. Eyes:      Extraocular Movements: Extraocular movements intact. Conjunctiva/sclera: Conjunctivae normal.      Pupils: Pupils are equal, round, and reactive to light. Cardiovascular:      Rate and Rhythm: Normal rate and regular rhythm. Pulmonary:      Effort: Pulmonary effort is normal. No respiratory distress. Breath sounds: Normal breath sounds. Abdominal:      General: Bowel sounds are normal.      Palpations: Abdomen is soft. Tenderness: There is no abdominal tenderness. Musculoskeletal:      Cervical back: Neck supple. Right lower leg: No edema. Left lower leg: No edema. Neurological:      Mental Status: She is alert and oriented to person, place, and time.       Cranial Nerves: No cranial nerve deficit. Psychiatric:      Comments: tearful        An electronic signature was used to authenticate this note.     --Adriano Marroquin, DO

## 2021-08-02 ASSESSMENT — ENCOUNTER SYMPTOMS
DIARRHEA: 0
CONSTIPATION: 0

## 2021-09-21 ENCOUNTER — OFFICE VISIT (OUTPATIENT)
Dept: FAMILY MEDICINE CLINIC | Age: 41
End: 2021-09-21
Payer: COMMERCIAL

## 2021-09-21 VITALS
SYSTOLIC BLOOD PRESSURE: 120 MMHG | BODY MASS INDEX: 25.94 KG/M2 | WEIGHT: 161.4 LBS | HEART RATE: 72 BPM | HEIGHT: 66 IN | DIASTOLIC BLOOD PRESSURE: 78 MMHG | OXYGEN SATURATION: 97 %

## 2021-09-21 DIAGNOSIS — Z00.00 WELLNESS EXAMINATION: Primary | ICD-10-CM

## 2021-09-21 DIAGNOSIS — Z13.220 LIPID SCREENING: ICD-10-CM

## 2021-09-21 DIAGNOSIS — Z13.1 DIABETES MELLITUS SCREENING: ICD-10-CM

## 2021-09-21 PROCEDURE — 99395 PREV VISIT EST AGE 18-39: CPT | Performed by: NURSE PRACTITIONER

## 2021-09-21 ASSESSMENT — ENCOUNTER SYMPTOMS
DIARRHEA: 0
WHEEZING: 0
VOMITING: 0
CHEST TIGHTNESS: 0
COUGH: 0
SINUS PRESSURE: 0
NAUSEA: 0
RHINORRHEA: 0
SORE THROAT: 0
SHORTNESS OF BREATH: 0
ABDOMINAL PAIN: 0
SINUS PAIN: 0

## 2021-09-21 NOTE — PROGRESS NOTES
Chris Cargo   46 y.o.  female  [id number]      Chief Complaint   Patient presents with    Annual Exam        Subjective:  46 y. o.female is here for a follow up. She has the following chronic/acute medical problems:  Patient Active Problem List   Diagnosis    IBS (irritable bowel syndrome)    Anxiety disorder    Depression    Essential hypertension       Patient is here for annual exam that she has to have done for her work insurance. Review of Systems   Constitutional: Negative for appetite change, chills, fatigue and fever. HENT: Negative for congestion, ear pain, postnasal drip, rhinorrhea, sinus pressure, sinus pain, sneezing and sore throat. Respiratory: Negative for cough, chest tightness, shortness of breath and wheezing. Cardiovascular: Negative for chest pain and palpitations. Gastrointestinal: Negative for abdominal pain, diarrhea, nausea and vomiting. Endocrine: Negative for cold intolerance, heat intolerance, polydipsia, polyphagia and polyuria. Genitourinary: Negative for difficulty urinating. Skin: Negative for rash. Neurological: Negative for dizziness, light-headedness and headaches. Current Outpatient Medications   Medication Sig Dispense Refill    desvenlafaxine succinate (PRISTIQ) 50 MG TB24 extended release tablet Take 1 tablet by mouth daily 30 tablet 3    famciclovir (FAMVIR) 500 MG tablet Take three tablets for one dose as needed. 9 tablet 5    ibuprofen (ADVIL;MOTRIN) 600 MG tablet Take 1 tablet by mouth every 8 hours as needed for Pain. 60 tablet 0    multivitamin (THERAGRAN) per tablet Take 1 tablet by mouth 2 times daily.       metoprolol succinate (TOPROL XL) 25 MG extended release tablet Take 1 tablet by mouth daily (Patient not taking: Reported on 9/21/2021) 30 tablet 3    lubiprostone (AMITIZA) 8 MCG CAPS capsule Take 1 capsule by mouth daily (Patient not taking: Reported on 9/21/2021) 30 capsule 2     No current facility-administered medications for this visit. Past medical, family,surgical history reviewed today. Objective:  /78   Pulse 72   Ht 5' 6\" (1.676 m)   Wt 161 lb 6.4 oz (73.2 kg)   SpO2 97%   Breastfeeding No   BMI 26.05 kg/m²   BP Readings from Last 3 Encounters:   09/21/21 120/78   07/28/21 (!) 124/92   06/28/21 130/78     Wt Readings from Last 3 Encounters:   09/21/21 161 lb 6.4 oz (73.2 kg)   07/28/21 162 lb 6.4 oz (73.7 kg)   06/28/21 168 lb 12.8 oz (76.6 kg)         Physical Exam  Constitutional:       Appearance: Normal appearance. HENT:      Head: Normocephalic. Right Ear: Tympanic membrane, ear canal and external ear normal.      Left Ear: Tympanic membrane, ear canal and external ear normal.      Nose: Nose normal.      Mouth/Throat:      Lips: Pink. Mouth: Mucous membranes are moist.      Pharynx: Oropharynx is clear. Cardiovascular:      Rate and Rhythm: Normal rate and regular rhythm. Heart sounds: Normal heart sounds. Pulmonary:      Effort: Pulmonary effort is normal.      Breath sounds: Normal breath sounds. Abdominal:      General: Bowel sounds are normal.      Palpations: Abdomen is soft. Tenderness: There is no abdominal tenderness. Musculoskeletal:      Cervical back: Neck supple. Skin:     General: Skin is warm and dry. Neurological:      Mental Status: She is alert and oriented to person, place, and time. Cranial Nerves: Cranial nerves are intact. Sensory: Sensation is intact. Motor: Motor function is intact. Coordination: Coordination is intact. Gait: Gait is intact. Deep Tendon Reflexes:      Reflex Scores:       Patellar reflexes are 2+ on the right side and 2+ on the left side.   Psychiatric:         Mood and Affect: Mood normal.         Behavior: Behavior normal.         Lab Results   Component Value Date    WBC 7.6 09/24/2020    HGB 12.2 09/24/2020    HCT 36.5 09/24/2020    MCV 96.1 09/24/2020     09/24/2020     Lab Results Component Value Date     09/24/2020    K 4.0 09/24/2020     09/24/2020    CO2 23 09/24/2020    BUN 10 09/24/2020    CREATININE 0.6 09/24/2020    GLUCOSE 81 09/24/2020    CALCIUM 9.1 09/24/2020    PROT 6.6 09/24/2020    LABALBU 4.3 09/24/2020    BILITOT 0.8 09/24/2020    ALKPHOS 62 09/24/2020    AST 18 09/24/2020    ALT 11 09/24/2020    LABGLOM >60 09/24/2020    GFRAA >60 09/24/2020    AGRATIO 1.9 09/24/2020    GLOB 2.3 09/24/2020     Lab Results   Component Value Date    CHOL 188 09/24/2020    CHOL 197 03/12/2019    CHOL 182 02/12/2018     Lab Results   Component Value Date    TRIG 53 09/24/2020    TRIG 86 03/12/2019    TRIG 66 02/12/2018     Lab Results   Component Value Date    HDL 87 (H) 09/24/2020    HDL 76 (H) 03/12/2019    HDL 76 (H) 02/12/2018     Lab Results   Component Value Date    LDLCALC 90 09/24/2020    LDLCALC 104 (H) 03/12/2019    LDLCALC 93 02/12/2018     Lab Results   Component Value Date    LABA1C 4.4 02/12/2018     Lab Results   Component Value Date    TSHFT4 1.36 09/24/2020    TSH 1.55 03/12/2019    TSHHS 1.470 08/07/2014         ASSESSMENT/PLAN:      1. Wellness examination      2. Lipid screening  - LIPID PANEL; Future    3. Diabetes mellitus screening  - Glucose; Future        No orders of the defined types were placed in this encounter. There are no discontinued medications. Care discussed with patient. Questions answered. Patient verbalizes understanding and agrees with plan. After visit summary provided. Advised to call for any problems, questions, or concerns. Return if symptoms worsen or fail to improve.                                              Signed:  CECI Gaviria CNP  09/21/21  6:57 PM

## 2021-09-23 ENCOUNTER — TELEPHONE (OUTPATIENT)
Dept: OBGYN | Age: 41
End: 2021-09-23

## 2021-09-23 RX ORDER — FLUCONAZOLE 150 MG/1
150 TABLET ORAL ONCE
Qty: 2 TABLET | Refills: 0 | Status: SHIPPED | OUTPATIENT
Start: 2021-09-23 | End: 2022-02-11 | Stop reason: SDUPTHER

## 2021-09-27 ENCOUNTER — TELEPHONE (OUTPATIENT)
Dept: FAMILY MEDICINE CLINIC | Age: 41
End: 2021-09-27

## 2021-09-27 NOTE — TELEPHONE ENCOUNTER
Please call patient and let her know that her wellness form is ready for . Let patient know that her cholesterol and LDL are a little marci. Cholesterol is 280 - want this number to be 199 or lower - LDL is 109 - want this number to be 99 or lower. I would recommend diet modifications at this time. Reduce saturated fats - these are found primarily in red meat and full fat dairy products. Eliminate trans fats - often used in [facility] ad store bought cookies, crackers, and cakes. Eat food rich in omega 3 fatty acids - These foods include anthony, mackerel, herring, walnuts, and flaxseeds. Increase soluble fiber - foods such as oatmeal, kidney beans, brussel sprout, apples, and pears. Add whey protein. Exercise up to 30 mins at least five days a week.

## 2021-09-27 NOTE — TELEPHONE ENCOUNTER
Pt aware of results and recommendations, pt voiced understanding of results and that form is ready for . Pt voiced no further questions or concerns at this time.

## 2021-09-29 ENCOUNTER — TELEPHONE (OUTPATIENT)
Dept: INTERNAL MEDICINE CLINIC | Age: 41
End: 2021-09-29

## 2021-09-29 RX ORDER — DULOXETIN HYDROCHLORIDE 30 MG/1
30 CAPSULE, DELAYED RELEASE ORAL DAILY
Qty: 30 CAPSULE | Refills: 1 | Status: SHIPPED | OUTPATIENT
Start: 2021-09-29 | End: 2021-11-04 | Stop reason: SDUPTHER

## 2021-09-29 NOTE — TELEPHONE ENCOUNTER
----- Message from Young Parks sent at 9/29/2021  7:51 AM EDT -----  Subject: Message to Provider    QUESTIONS  Information for Provider? Patient say she wants to be put back on Cymbalta   because the [address] [place] [address] makes her feel weird. She says she doesn't feel like   herself. please send to [phone]  ---------------------------------------------------------------------------  --------------  [employer]  What is the best way for the office to contact you? OK to leave message on   voicemail  Preferred Call Back Phone Number? [phone]  ---------------------------------------------------------------------------  --------------  SCRIPT ANSWERS  Relationship to Patient?  Self

## 2021-11-04 ENCOUNTER — OFFICE VISIT (OUTPATIENT)
Dept: INTERNAL MEDICINE CLINIC | Age: 41
End: 2021-11-04
Payer: COMMERCIAL

## 2021-11-04 VITALS
OXYGEN SATURATION: 98 % | TEMPERATURE: 97.2 F | HEART RATE: 71 BPM | SYSTOLIC BLOOD PRESSURE: 138 MMHG | DIASTOLIC BLOOD PRESSURE: 82 MMHG | HEIGHT: 66 IN | WEIGHT: 161.4 LBS | BODY MASS INDEX: 25.94 KG/M2

## 2021-11-04 DIAGNOSIS — F41.9 ANXIETY: ICD-10-CM

## 2021-11-04 DIAGNOSIS — F32.A DEPRESSION, UNSPECIFIED DEPRESSION TYPE: Primary | ICD-10-CM

## 2021-11-04 DIAGNOSIS — L71.9 ROSACEA: ICD-10-CM

## 2021-11-04 PROCEDURE — 99213 OFFICE O/P EST LOW 20 MIN: CPT | Performed by: FAMILY MEDICINE

## 2021-11-04 RX ORDER — DULOXETIN HYDROCHLORIDE 30 MG/1
30 CAPSULE, DELAYED RELEASE ORAL DAILY
Qty: 30 CAPSULE | Refills: 5 | Status: SHIPPED | OUTPATIENT
Start: 2021-11-04 | End: 2022-01-25 | Stop reason: SDUPTHER

## 2021-11-04 SDOH — ECONOMIC STABILITY: FOOD INSECURITY: WITHIN THE PAST 12 MONTHS, THE FOOD YOU BOUGHT JUST DIDN'T LAST AND YOU DIDN'T HAVE MONEY TO GET MORE.: NEVER TRUE

## 2021-11-04 SDOH — ECONOMIC STABILITY: FOOD INSECURITY: WITHIN THE PAST 12 MONTHS, YOU WORRIED THAT YOUR FOOD WOULD RUN OUT BEFORE YOU GOT MONEY TO BUY MORE.: NEVER TRUE

## 2021-11-04 ASSESSMENT — SOCIAL DETERMINANTS OF HEALTH (SDOH): HOW HARD IS IT FOR YOU TO PAY FOR THE VERY BASICS LIKE FOOD, HOUSING, MEDICAL CARE, AND HEATING?: NOT HARD AT ALL

## 2021-11-04 ASSESSMENT — ENCOUNTER SYMPTOMS
COUGH: 0
SHORTNESS OF BREATH: 0
ABDOMINAL PAIN: 0
BACK PAIN: 0
NAUSEA: 0

## 2021-11-04 NOTE — PATIENT INSTRUCTIONS
Patient Education        Rosacea: Care Instructions  Your Care Instructions  Rosacea (say \"Amadou\") is a skin condition that can cause redness, pimples, and red lines on the nose, cheeks, chin, and forehead. It is often mistaken for acne because it can cause outbreaks with bumps like pimples. Rosacea can also cause burning and soreness in your eyes. Rosacea is usually controlled by using medicine and avoiding alcohol, the sun, and other things that can make rosacea worse. Your doctor may have prescribed medicines or other treatment. If antibiotics do not control the rosacea, your doctor may try other medicines. Follow-up care is a key part of your treatment and safety. Be sure to make and go to all appointments, and call your doctor if you are having problems. It's also a good idea to know your test results and keep a list of the medicines you take. How can you care for yourself at home? · Take your medicines exactly as prescribed. Call your doctor if you think you are having a problem with your medicine. · Protect your face from the sun by wearing hats with wide brims and sunglasses. Try to stay out of the sun or find shade if you need to be outdoors. Use a sunscreen for sensitive skin with an SPF of 30 or higher on any exposed skin. · Use soaps, lotions, and makeup made for sensitive skin or rosacea. These do not contain alcohol, are not abrasive, and will not clog pores. · There are over-the-counter skin care products available that are specifically for people with rosacea. These products can help mask facial redness without irritating your skin. · Avoid rubbing or scrubbing your face. · If you have rosacea on your eyelids, put a warm, wet towel, or compress, on your eyes several times a day. Gently wash your eyelids with a washcloth or an eyelid cleanser that is sold in [employer]. Use artificial tears if your eyes feel dry. · Make a list or keep a diary of things that may trigger your rosacea. Use the diary every day for several weeks. Avoid whatever you find that makes your rosacea worse. These triggers may include:  ? Harsh weather. Wear a hat and scarf to shield your face from the cold and wind. Use a moisturizer during the winter to keep your face moist.  ? Stress. Eat a healthy diet and get plenty of exercise and sleep. ? Alcohol, spicy foods, or hot drinks. Avoid or limit these if they make your rosacea worse. ? Getting too hot when you exercise. Try working out for a shorter time. In the summer, exercise during the cool morning hours. ? Hot showers. Take warm or cool showers and avoid hot tubs and saunas. When should you call for help? Watch closely for changes in your health, and be sure to contact your doctor if:    · You do not get better as expected. Where can you learn more? Go to [email or web address].[email or web address]. org and sign in to your Mangia account. Enter R828 in the Follicum box to learn more about \"Rosacea: Care Instructions. \"     If you do not have an account, please click on the \"Sign Up Now\" link. Current as of: March 3, 2021               Content Version: 13.0  © 2006-2021 Healthwise, Incorporated. Care instructions adapted under license by [facility] ([facility]). If you have questions about a medical condition or this instruction, always ask your healthcare professional. [place] [address] any warranty or liability for your use of this information.

## 2021-11-04 NOTE — PROGRESS NOTES
Gayathri Zavaleta (:  1980) is a 46 y.o. female,Established patient, here for evaluation of the following chief complaint(s):  3 Month Follow-Up, Anxiety, Depression, and Rash (face x 1 - 2 months)         ASSESSMENT/PLAN:  1. Depression, unspecified depression type  -     DULoxetine (CYMBALTA) 30 MG extended release capsule; Take 1 capsule by mouth daily, Disp-30 capsule, R-5D/C PristiqNormal  2. Anxiety  -     DULoxetine (CYMBALTA) 30 MG extended release capsule; Take 1 capsule by mouth daily, Disp-30 capsule, R-5D/C PristiqNormal  3. Rosacea  Start Metrocream  -     metroNIDAZOLE (METROCREAM) 0.75 % cream; Apply topically 2 times daily to face, Disp-45 g, R-0, Normal  -Avoid triggers  Persist RTO or call  On this date 2021 I have spent 20 minutes reviewing previous notes, test results and face to face with the patient discussing the diagnosis and importance of compliance with the treatment plan as well as documenting on the day of the visit. Return in about 5 months (around 2022) for Depression. Subjective   SUBJECTIVE/OBJECTIVE:  HPI : This  47 yo C F here for the following  Patient Active Problem List    Diagnosis Date Noted    Depression 2021    Essential hypertension 2021    IBS (irritable bowel syndrome) 2014    Anxiety disorder 2014     Bp has been better. Pt off of Metoprolol as it made her tired  Depression/ anxiety- Pt could not tolerate Pristq. Pt on on Cymbalta 30 mg. Stable on medication  *Pt got her flu vaccination at work  Pt seen Dr. Lipscomb Son- Pl surgeon for a cyst on L upper chest. She has rash to face for the past 2 months- pt used Hydrocortisone- no help. Pt used Medrol dose isak and this helped with her on it. Pt has a history of rosacea. This seems the likely cause of her flare up      Review of Systems   Constitutional: Negative for diaphoresis and fever. Respiratory: Negative for cough and shortness of breath.     Cardiovascular: Negative for chest pain and palpitations. Gastrointestinal: Negative for abdominal pain and nausea. Musculoskeletal: Negative for back pain. Skin: Positive for rash. Neurological: Negative for dizziness and headaches. Psychiatric/Behavioral: Negative for dysphoric mood and suicidal ideas. Allergies   Allergen Reactions    Sulfa Antibiotics      Current Outpatient Medications   Medication Sig Dispense Refill    DULoxetine (CYMBALTA) 30 MG extended release capsule Take 1 capsule by mouth daily 30 capsule 5    metroNIDAZOLE (METROCREAM) 0.75 % cream Apply topically 2 times daily to face 45 g 0    famciclovir (FAMVIR) 500 MG tablet Take three tablets for one dose as needed. 9 tablet 5    ibuprofen (ADVIL;MOTRIN) 600 MG tablet Take 1 tablet by mouth every 8 hours as needed for Pain. 60 tablet 0    multivitamin (THERAGRAN) per tablet Take 1 tablet by mouth 2 times daily. No current facility-administered medications for this visit. Vitals:    11/04/21 1459   BP: 138/82   Pulse: 71   Temp: 97.2 °F (36.2 °C)   SpO2: 98%   Weight: 161 lb 6.4 oz (73.2 kg)   Height: 5' 6\" (1.676 m)     Objective   Physical Exam  Vitals reviewed. Constitutional:       General: She is not in acute distress. Eyes:      Extraocular Movements: Extraocular movements intact. Conjunctiva/sclera: Conjunctivae normal.   Cardiovascular:      Rate and Rhythm: Normal rate and regular rhythm. Pulmonary:      Effort: Pulmonary effort is normal. No respiratory distress. Breath sounds: Normal breath sounds. Abdominal:      General: Bowel sounds are normal.      Palpations: Abdomen is soft. Tenderness: There is no abdominal tenderness. Musculoskeletal:      Cervical back: Neck supple. Right lower leg: No edema. Left lower leg: No edema. Skin:     Findings: Rash (redness to cheeks, chin) present. Neurological:      Mental Status: She is alert and oriented to person, place, and time.    Psychiatric: Mood and Affect: Mood normal.                An electronic signature was used to authenticate this note.     --Amado Finch, DO

## 2021-11-19 ENCOUNTER — TELEPHONE (OUTPATIENT)
Dept: INTERNAL MEDICINE CLINIC | Age: 41
End: 2021-11-19

## 2021-11-19 DIAGNOSIS — I10 ESSENTIAL HYPERTENSION: Primary | ICD-10-CM

## 2021-11-19 RX ORDER — LOSARTAN POTASSIUM 50 MG/1
50 TABLET ORAL DAILY
Qty: 30 TABLET | Refills: 2 | Status: SHIPPED | OUTPATIENT
Start: 2021-11-19 | End: 2022-01-25 | Stop reason: SDUPTHER

## 2021-11-19 NOTE — TELEPHONE ENCOUNTER
Her blood pressure is running high the past few days like 140/90, some chest pain and headache. Said you told her to keep watch on it.  Please advise

## 2021-11-19 NOTE — TELEPHONE ENCOUNTER
Call to move appt up for HTN in 2 to 3 months      Spoke to pt. She just wants to start a  New med. Metoprolol caused her to be too tired. Losartan sent in. ADR's explained.  Pt will get BMP in 1 week

## 2022-01-17 ENCOUNTER — TELEPHONE (OUTPATIENT)
Dept: INTERNAL MEDICINE CLINIC | Age: 42
End: 2022-01-17

## 2022-01-17 NOTE — TELEPHONE ENCOUNTER
Tested positive for Covid on this past Thursday, wants to know if you can call her in a Medrol Dose PK?  Meijers please

## 2022-01-25 ENCOUNTER — OFFICE VISIT (OUTPATIENT)
Dept: INTERNAL MEDICINE CLINIC | Age: 42
End: 2022-01-25
Payer: COMMERCIAL

## 2022-01-25 VITALS
TEMPERATURE: 97.1 F | SYSTOLIC BLOOD PRESSURE: 112 MMHG | OXYGEN SATURATION: 99 % | WEIGHT: 169 LBS | HEIGHT: 66 IN | BODY MASS INDEX: 27.16 KG/M2 | DIASTOLIC BLOOD PRESSURE: 60 MMHG | HEART RATE: 84 BPM

## 2022-01-25 DIAGNOSIS — I10 ESSENTIAL HYPERTENSION: ICD-10-CM

## 2022-01-25 DIAGNOSIS — F41.9 ANXIETY: ICD-10-CM

## 2022-01-25 DIAGNOSIS — F32.A DEPRESSION, UNSPECIFIED DEPRESSION TYPE: ICD-10-CM

## 2022-01-25 PROCEDURE — 99213 OFFICE O/P EST LOW 20 MIN: CPT | Performed by: FAMILY MEDICINE

## 2022-01-25 RX ORDER — LOSARTAN POTASSIUM 50 MG/1
50 TABLET ORAL DAILY
Qty: 30 TABLET | Refills: 5 | Status: SHIPPED | OUTPATIENT
Start: 2022-01-25 | End: 2022-06-07 | Stop reason: SDUPTHER

## 2022-01-25 RX ORDER — DULOXETIN HYDROCHLORIDE 30 MG/1
30 CAPSULE, DELAYED RELEASE ORAL DAILY
Qty: 30 CAPSULE | Refills: 5 | Status: SHIPPED | OUTPATIENT
Start: 2022-01-25 | End: 2022-06-03

## 2022-01-25 RX ORDER — FAMCICLOVIR 500 MG/1
TABLET, FILM COATED ORAL
Qty: 9 TABLET | Refills: 5 | Status: SHIPPED | OUTPATIENT
Start: 2022-01-25

## 2022-01-25 ASSESSMENT — ENCOUNTER SYMPTOMS
COUGH: 0
SHORTNESS OF BREATH: 0
NAUSEA: 0
ABDOMINAL PAIN: 0

## 2022-01-25 NOTE — PROGRESS NOTES
Mendoza Quinteros (:  1980) is a 38 y.o. female,Established patient, here for evaluation of the following chief complaint(s): Anxiety (5-6 month follow up) and Hypertension         ASSESSMENT/PLAN:  1. Essential hypertension, chronic  -     losartan (COZAAR) 50 MG tablet; Take 1 tablet by mouth daily, Disp-30 tablet, R-5Normal  2. Depression, unspecified depression type, chronic  -     DULoxetine (CYMBALTA) 30 MG extended release capsule; Take 1 capsule by mouth daily, Disp-30 capsule, R-5D/C PristiqNormal  3. Anxiety, chronic  -     DULoxetine (CYMBALTA) 30 MG extended release capsule; Take 1 capsule by mouth daily, Disp-30 capsule, R-5D/C PristiqNormal    On this date 2022 I have spent 20 minutes reviewing previous notes, test results and face to face with the patient discussing the diagnosis and importance of compliance with the treatment plan as well as documenting on the day of the visit. Return for Follow up in  5 1/2 months for HTN.        Lab Results   Component Value Date    WBC 8.5 12/15/2021    HGB 13.9 12/15/2021    HCT 41.1 12/15/2021    MCV 94 12/15/2021     12/15/2021     Lab Results   Component Value Date     12/15/2021    K 4.5 12/15/2021     12/15/2021    CO2 24 12/15/2021    BUN 13 12/15/2021    CREATININE 0.78 12/15/2021    GLUCOSE 87 12/15/2021    CALCIUM 9.2 12/15/2021    PROT 7.5 12/15/2021    LABALBU 4.5 12/15/2021    BILITOT 0.4 12/15/2021    ALKPHOS 69 12/15/2021    AST 22 12/15/2021    ALT 15 12/15/2021    LABGLOM 96 12/15/2021    GFRAA 111 12/15/2021    AGRATIO 1.5 12/15/2021    GLOB 3.0 12/15/2021       Lab Results   Component Value Date    TSHFT4 1.36 2020    TSH 1.060 12/15/2021       Subjective   SUBJECTIVE/OBJECTIVE:  HPI: This 37 yo  F here for the following  Patient Active Problem List   Diagnosis    IBS (irritable bowel syndrome)    Anxiety disorder    Depression    Essential hypertension     HTN- stable on Losartan  Depression/Anxiety- On Cymbalta 30 mg. She would like to stay on this dose  Hx of Covid 19 recently. She can still feel tired    Review of Systems   Constitutional: Negative for diaphoresis and fever. Respiratory: Negative for cough and shortness of breath. Cardiovascular: Negative for chest pain. Gastrointestinal: Negative for abdominal pain and nausea. Neurological: Negative for dizziness and headaches. Psychiatric/Behavioral: Negative for dysphoric mood. Allergies   Allergen Reactions    Sulfa Antibiotics      Current Outpatient Medications   Medication Sig Dispense Refill    DULoxetine (CYMBALTA) 30 MG extended release capsule Take 1 capsule by mouth daily 30 capsule 5    famciclovir (FAMVIR) 500 MG tablet Take three tablets for one dose as needed. 9 tablet 5    losartan (COZAAR) 50 MG tablet Take 1 tablet by mouth daily 30 tablet 5    metroNIDAZOLE (METROCREAM) 0.75 % cream Apply topically 2 times daily to face 45 g 0    ibuprofen (ADVIL;MOTRIN) 600 MG tablet Take 1 tablet by mouth every 8 hours as needed for Pain. 60 tablet 0    multivitamin (THERAGRAN) per tablet Take 1 tablet by mouth 2 times daily. No current facility-administered medications for this visit. Vitals:    01/25/22 1414   BP: 112/60   Site: Right Upper Arm   Position: Sitting   Cuff Size: Medium Adult   Pulse: 84   Temp: 97.1 °F (36.2 °C)   SpO2: 99%   Weight: 169 lb (76.7 kg)   Height: 5' 6\" (1.676 m)     Objective   Physical Exam  Vitals reviewed. Constitutional:       General: She is not in acute distress. Eyes:      Conjunctiva/sclera: Conjunctivae normal.   Cardiovascular:      Rate and Rhythm: Normal rate and regular rhythm. Pulmonary:      Effort: Pulmonary effort is normal. No respiratory distress. Breath sounds: Normal breath sounds. Abdominal:      General: Bowel sounds are normal.      Palpations: Abdomen is soft. Tenderness: There is no abdominal tenderness.    Musculoskeletal:      Cervical back: Neck supple. Right lower leg: No edema. Left lower leg: No edema. Neurological:      Mental Status: She is alert and oriented to person, place, and time. Psychiatric:         Mood and Affect: Mood normal.                An electronic signature was used to authenticate this note.     --Melissa Hough, DO

## 2022-02-10 NOTE — TELEPHONE ENCOUNTER
This was routed to me. I did not fill this initially it was her Gyn.  Call to find out if she is having a yeast infection or other

## 2022-02-11 RX ORDER — FLUCONAZOLE 150 MG/1
TABLET ORAL
Qty: 2 TABLET | Refills: 0 | Status: SHIPPED | OUTPATIENT
Start: 2022-02-11 | End: 2022-06-07

## 2022-03-30 ENCOUNTER — TELEPHONE (OUTPATIENT)
Dept: INTERNAL MEDICINE CLINIC | Age: 42
End: 2022-03-30

## 2022-03-30 NOTE — TELEPHONE ENCOUNTER
----- Message from Letty Kowalski sent at 3/30/2022 12:23 PM EDT -----  Subject: Message to Provider    QUESTIONS  Information for Provider? Patient is wondering when she needs to come back   in for a follow up. Please advise. Thanks  ---------------------------------------------------------------------------  --------------  Deannie Sandhoff INFO  What is the best way for the office to contact you? OK to leave message on   voicemail  Preferred Call Back Phone Number? [phone]  ---------------------------------------------------------------------------  --------------  SCRIPT ANSWERS  Relationship to Patient?  Self

## 2022-06-03 ENCOUNTER — TELEPHONE (OUTPATIENT)
Dept: INTERNAL MEDICINE CLINIC | Age: 42
End: 2022-06-03

## 2022-06-03 RX ORDER — BUPROPION HYDROCHLORIDE 150 MG/1
150 TABLET ORAL EVERY MORNING
Qty: 30 TABLET | Refills: 0 | Status: SHIPPED | OUTPATIENT
Start: 2022-06-03 | End: 2022-06-07 | Stop reason: SDUPTHER

## 2022-06-03 NOTE — TELEPHONE ENCOUNTER
Patient has been off Cymbalta for a month. She  request to be put on Wellbutrin 150 mg once a day prior to  Appt 6/7/2022  Can this be called in? She was on Wellbutrin in the past for depression and anxiety.

## 2022-06-07 ENCOUNTER — OFFICE VISIT (OUTPATIENT)
Dept: INTERNAL MEDICINE CLINIC | Age: 42
End: 2022-06-07
Payer: COMMERCIAL

## 2022-06-07 VITALS
BODY MASS INDEX: 27.16 KG/M2 | SYSTOLIC BLOOD PRESSURE: 118 MMHG | HEART RATE: 65 BPM | OXYGEN SATURATION: 99 % | HEIGHT: 66 IN | DIASTOLIC BLOOD PRESSURE: 64 MMHG | WEIGHT: 169 LBS

## 2022-06-07 DIAGNOSIS — N95.1 MENOPAUSAL SYMPTOMS: ICD-10-CM

## 2022-06-07 DIAGNOSIS — F32.A DEPRESSION, UNSPECIFIED DEPRESSION TYPE: ICD-10-CM

## 2022-06-07 DIAGNOSIS — I10 ESSENTIAL HYPERTENSION: Primary | ICD-10-CM

## 2022-06-07 DIAGNOSIS — Z13.1 SCREENING FOR DIABETES MELLITUS: ICD-10-CM

## 2022-06-07 DIAGNOSIS — R63.5 WEIGHT GAIN: ICD-10-CM

## 2022-06-07 LAB — TSH REFLEX FT4: 2.2 UIU/ML (ref 0.27–4.2)

## 2022-06-07 PROCEDURE — 99213 OFFICE O/P EST LOW 20 MIN: CPT | Performed by: FAMILY MEDICINE

## 2022-06-07 PROCEDURE — 36415 COLL VENOUS BLD VENIPUNCTURE: CPT | Performed by: FAMILY MEDICINE

## 2022-06-07 RX ORDER — LOSARTAN POTASSIUM 50 MG/1
50 TABLET ORAL DAILY
Qty: 30 TABLET | Refills: 5 | Status: SHIPPED | OUTPATIENT
Start: 2022-06-07

## 2022-06-07 RX ORDER — BUPROPION HYDROCHLORIDE 150 MG/1
150 TABLET ORAL EVERY MORNING
Qty: 30 TABLET | Refills: 5 | Status: SHIPPED | OUTPATIENT
Start: 2022-06-07 | End: 2022-11-04

## 2022-06-07 ASSESSMENT — PATIENT HEALTH QUESTIONNAIRE - PHQ9
2. FEELING DOWN, DEPRESSED OR HOPELESS: 0
SUM OF ALL RESPONSES TO PHQ QUESTIONS 1-9: 2
SUM OF ALL RESPONSES TO PHQ9 QUESTIONS 1 & 2: 0
5. POOR APPETITE OR OVEREATING: 0
10. IF YOU CHECKED OFF ANY PROBLEMS, HOW DIFFICULT HAVE THESE PROBLEMS MADE IT FOR YOU TO DO YOUR WORK, TAKE CARE OF THINGS AT HOME, OR GET ALONG WITH OTHER PEOPLE: 0
8. MOVING OR SPEAKING SO SLOWLY THAT OTHER PEOPLE COULD HAVE NOTICED. OR THE OPPOSITE, BEING SO FIGETY OR RESTLESS THAT YOU HAVE BEEN MOVING AROUND A LOT MORE THAN USUAL: 0
7. TROUBLE CONCENTRATING ON THINGS, SUCH AS READING THE NEWSPAPER OR WATCHING TELEVISION: 0
SUM OF ALL RESPONSES TO PHQ QUESTIONS 1-9: 2
6. FEELING BAD ABOUT YOURSELF - OR THAT YOU ARE A FAILURE OR HAVE LET YOURSELF OR YOUR FAMILY DOWN: 0
SUM OF ALL RESPONSES TO PHQ QUESTIONS 1-9: 2
4. FEELING TIRED OR HAVING LITTLE ENERGY: 2
SUM OF ALL RESPONSES TO PHQ QUESTIONS 1-9: 2
9. THOUGHTS THAT YOU WOULD BE BETTER OFF DEAD, OR OF HURTING YOURSELF: 0
1. LITTLE INTEREST OR PLEASURE IN DOING THINGS: 0
3. TROUBLE FALLING OR STAYING ASLEEP: 0

## 2022-06-07 ASSESSMENT — ENCOUNTER SYMPTOMS
ABDOMINAL PAIN: 0
SHORTNESS OF BREATH: 0
NAUSEA: 0
COUGH: 0

## 2022-06-07 NOTE — PROGRESS NOTES
Navid Velazquez (:  1980) is a 38 y.o. female,Established patient, here for evaluation of the following chief complaint(s): Other (wants to change medication), Weight Gain, and Hypertension         ASSESSMENT/PLAN:  1. Essential hypertension, chronic  -     losartan (COZAAR) 50 mg tablet; Take 1 tablet by mouth daily, Disp-30 tablet, R-5Normal  2. Depression, unspecified depression type, chronic  -     buPROPion (WELLBUTRIN XL) 150 MG extended release tablet; Take 1 tablet by mouth every morning, Disp-30 tablet, R-5D/C CymbaltaNormal  Cymbalta has been stopped  3. Weight gain  -     TSH with Reflex to FT4  The patient is asked to make an attempt to improve diet and exercise patterns  4. Menopausal symptoms  -     Estradiol  -     Follicle Stimulating Hormone  5. Screening for diabetes mellitus  -     Hemoglobin A1C    Return in about 5 months (around 2022) for HTN, Depression. Subjective   SUBJECTIVE/OBJECTIVE:  HPI: This  39 yo F here for the following  Patient Active Problem List    Diagnosis Date Noted    Depression 2021    Essential hypertension 2021    IBS (irritable bowel syndrome) 2014    Anxiety disorder 2014       HTN- stable on medication  Depression- On Wellbutrin. She would like to remain on the medication. Off Cymbalta  Hysterectomy at 31 yo. Still has ovaries. She can have some menopausal symptoms  +Wt gain- stop eating at 7 pm and will eat at 8 am. Pt can drink Etoh. Gave up beer   In am she will eat - bananas, peanut butter, yogurt                                     Lifting some weights, walking for exercise  Seen Dr. Mauro Ritter- Spine surgeon. He has referred her to a neurosurgeon    Review of Systems   Constitutional: Negative for diaphoresis and fever. Respiratory: Negative for cough and shortness of breath. Cardiovascular: Negative for chest pain and palpitations. Gastrointestinal: Negative for abdominal pain and nausea.    Neurological: Negative for dizziness and headaches. Psychiatric/Behavioral: Negative for dysphoric mood. Allergies   Allergen Reactions    Sulfa Antibiotics      Current Outpatient Medications   Medication Sig Dispense Refill    buPROPion (WELLBUTRIN XL) 150 MG extended release tablet Take 1 tablet by mouth every morning 30 tablet 5    losartan (COZAAR) 50 mg tablet Take 1 tablet by mouth daily 30 tablet 5    famciclovir (FAMVIR) 500 MG tablet Take three tablets for one dose as needed. 9 tablet 5    metroNIDAZOLE (METROCREAM) 0.75 % cream Apply topically 2 times daily to face 45 g 0    ibuprofen (ADVIL;MOTRIN) 600 MG tablet Take 1 tablet by mouth every 8 hours as needed for Pain. 60 tablet 0    multivitamin (THERAGRAN) per tablet Take 1 tablet by mouth 2 times daily. No current facility-administered medications for this visit. Vitals:    06/07/22 1452   BP: 118/64   Site: Right Upper Arm   Position: Sitting   Cuff Size: Medium Adult   Pulse: 65   SpO2: 99%   Weight: 169 lb (76.7 kg)   Height: 5' 6\" (1.676 m)     Objective   Physical Exam  Vitals reviewed. Constitutional:       General: She is not in acute distress. Eyes:      General: No scleral icterus. Cardiovascular:      Rate and Rhythm: Normal rate and regular rhythm. Pulmonary:      Effort: Pulmonary effort is normal. No respiratory distress. Breath sounds: Normal breath sounds. Abdominal:      Palpations: Abdomen is soft. Tenderness: There is no abdominal tenderness. Musculoskeletal:      Cervical back: Neck supple. Right lower leg: No edema. Left lower leg: No edema. Neurological:      Mental Status: She is alert and oriented to person, place, and time. Psychiatric:         Mood and Affect: Mood normal.                An electronic signature was used to authenticate this note.     --Wan Suazo DO

## 2022-06-08 LAB
ESTIMATED AVERAGE GLUCOSE: 88.2 MG/DL
ESTRADIOL LEVEL: 101 PG/ML
FOLLICLE STIMULATING HORMONE: 4 MIU/ML
HBA1C MFR BLD: 4.7 %

## 2022-07-12 ENCOUNTER — TELEPHONE (OUTPATIENT)
Dept: INTERNAL MEDICINE CLINIC | Age: 42
End: 2022-07-12

## 2022-07-12 RX ORDER — FLUCONAZOLE 150 MG/1
TABLET ORAL
Qty: 2 TABLET | Refills: 0 | Status: SHIPPED | OUTPATIENT
Start: 2022-07-12

## 2022-07-27 ENCOUNTER — HOSPITAL ENCOUNTER (OUTPATIENT)
Age: 42
Setting detail: SPECIMEN
Discharge: HOME | End: 2022-07-27
Payer: COMMERCIAL

## 2022-07-27 ENCOUNTER — OFFICE VISIT (OUTPATIENT)
Dept: FAMILY MEDICINE CLINIC | Age: 42
End: 2022-07-27
Payer: COMMERCIAL

## 2022-07-27 VITALS — TEMPERATURE: 98.2 F | HEART RATE: 116 BPM | OXYGEN SATURATION: 98 %

## 2022-07-27 DIAGNOSIS — J06.9 VIRAL URI: Primary | ICD-10-CM

## 2022-07-27 DIAGNOSIS — J02.9 ACUTE PHARYNGITIS, UNSPECIFIED ETIOLOGY: ICD-10-CM

## 2022-07-27 LAB — S PYO AG THROAT QL: NORMAL

## 2022-07-27 PROCEDURE — 99213 OFFICE O/P EST LOW 20 MIN: CPT | Performed by: NURSE PRACTITIONER

## 2022-07-27 PROCEDURE — U0003 INFECTIOUS AGENT DETECTION BY NUCLEIC ACID (DNA OR RNA); SEVERE ACUTE RESPIRATORY SYNDROME CORONAVIRUS 2 (SARS-COV-2) (CORONAVIRUS DISEASE [COVID-19]), AMPLIFIED PROBE TECHNIQUE, MAKING USE OF HIGH THROUGHPUT TECHNOLOGIES AS DESCRIBED BY CMS-2020-01-R: HCPCS

## 2022-07-27 PROCEDURE — 87880 STREP A ASSAY W/OPTIC: CPT | Performed by: NURSE PRACTITIONER

## 2022-07-27 PROCEDURE — U0005 INFEC AGEN DETEC AMPLI PROBE: HCPCS

## 2022-07-27 NOTE — PROGRESS NOTES
7/27/22  Starla Turcios  1980    FLU/COVID-19 CLINIC EVALUATION    HPI SYMPTOMS:    Employer: [employer]    [] Fevers  [x] Chills  [x] Cough  [] Coughing up blood  [] Chest Congestion  [] Nasal Congestion  [] Feeling short of breath  [] Sometimes  [] Frequently  [] All the time  [] Chest pain  [x] Headaches  [x]Tolerable  [] Severe  [x] Sore throat  [x] Muscle aches  [x] Nausea  [] Vomiting  []Unable to keep fluids down  [] Diarrhea  []Severe    [x] OTHER SYMPTOMS:  Sore gland in neck    Symptom Duration:   [] 1  [] 2   [] 3   [] 4    [] 5   [x] 6   [] 7   [] 8   [] 9   [] 10   [] 11   [] 12   [] 13   [] 14   [] Longer than 14 days    Symptom course:   [x] Worsening     [] Stable     [] Improving    RISK FACTORS:    [] Pregnant or possibly pregnant  [] Age over 61  [] Diabetes  [] Heart disease  [] Asthma  [] COPD/Other chronic lung diseases  [] Active Cancer  [] On Chemotherapy  [] Taking oral steroids  [] History Lymphoma/Leukemia  [] Close contact with a lab confirmed COVID-19 patient within 14 days of symptom onset  [] History of travel from affected geographical areas within 14 days of symptom onset       VITALS:  There were no vitals filed for this visit. TESTS:    POCT FLU:  [] Positive     []Negative    ASSESSMENT:    [] Flu  [] Possible COVID-19  [] Strep    PLAN:    [] Discharge home with written instructions for:  [] Flu management  [] Possible COVID-19 infection with self-quarantine and management of symptoms  [] Follow-up with primary care physician or emergency department if worsens  [] Evaluation per physician/NP/PA in clinic  [] Sent to ER       An  electronic signature was used to authenticate this note.      --Leo Ambrose LPN on 1/49/1667 at 4:35 PM

## 2022-07-27 NOTE — PROGRESS NOTES
7/27/2022    HPI:  Chief complaint and history of present illness as per medical assistant/nurse documented today in the Flu/COVID-19 clinic. Elias Maharaj is a 37 yo F established patient who presents re: upper resp symptoms x 6 days. Started with stomach upset, nausea, and headache and then approx 2 days later she developed severe sore throat/\"feels like razor blades\", enlarged lymph nodes, continued headache, fatigue, myalgias. She reports a \"reactive\" type cough - doesn't feel she has a true cough. No fevers, chills, vomiting, diarrhea, rash. She reports that other people have been sick at work. Not aware of any strep or mono exposures. She did take a rapid covid test this AM which was negative. Hx: HTN. MEDICATIONS:  Prior to Visit Medications    Medication Sig Taking? Authorizing Provider   fluconazole (DIFLUCAN) 150 MG tablet Take one tablet by mouth now, repeat in 3 days if symptoms persist  [place] Millington, DO   buPROPion (WELLBUTRIN XL) 150 MG extended release tablet Take 1 tablet by mouth every morning  [place] Millington, DO   losartan (COZAAR) 50 mg tablet Take 1 tablet by mouth daily  [place] Crown, DO   famciclovir (FAMVIR) 500 MG tablet Take three tablets for one dose as needed. [place] Millington, DO   metroNIDAZOLE (METROCREAM) 0.75 % cream Apply topically 2 times daily to face  [place] Crown, DO   ibuprofen (ADVIL;MOTRIN) 600 MG tablet Take 1 tablet by mouth every 8 hours as needed for Pain. Emeli Lo MD   multivitamin [facility]) per tablet Take 1 tablet by mouth 2 times daily.   Historical Provider, MD     Allergies   Allergen Reactions    Sulfa Antibiotics    ,   Past Medical History:   Diagnosis Date    Anxiety     IBS (irritable bowel syndrome)    ,   Past Surgical History:   Procedure Laterality Date    HYSTERECTOMY (CERVIX STATUS UNKNOWN)      partial; still has ovaries   ,   Social History     Tobacco Use    Smoking status: Former    Smokeless tobacco: Never   Substance Use Topics    Alcohol use: Yes     Comment: socially    Drug use: Not Currently     Vitals:    07/27/22 1625   Pulse: (!) 116   Temp: 98.2 °F (36.8 °C)   SpO2: 98%     PHYSICAL EXAM:  Physical Exam  Constitutional:       Appearance: Normal appearance. She is not toxic-appearing. HENT:      Head: Normocephalic and atraumatic. Right Ear: Tympanic membrane, ear canal and external ear normal.      Left Ear: Tympanic membrane, ear canal and external ear normal.      Nose: Nose normal.      Mouth/Throat:      Mouth: Mucous membranes are moist.      Pharynx: Oropharynx is clear. No oropharyngeal exudate or posterior oropharyngeal erythema. Eyes:      Extraocular Movements: Extraocular movements intact. Conjunctiva/sclera: Conjunctivae normal.   Cardiovascular:      Rate and Rhythm: Regular rhythm. Tachycardia present. Heart sounds: Normal heart sounds. Pulmonary:      Effort: Pulmonary effort is normal.      Breath sounds: Normal breath sounds. Musculoskeletal:         General: Normal range of motion. Cervical back: Normal range of motion and neck supple. Lymphadenopathy:      Cervical: Cervical adenopathy (b/l tonsillar node enlargement) present. Skin:     General: Skin is warm. Neurological:      General: No focal deficit present. Mental Status: She is alert and oriented to person, place, and time. Psychiatric:         Mood and Affect: Mood normal.         Behavior: Behavior normal.       ASSESSMENT/PLAN:  1. Viral URI  - COVID-19    2. Acute pharyngitis, unspecified etiology  - POCT rapid strep A    Discussed the following with Fredy James:   Rapid strep negative. COVID PCR pending. Viral upper respiratory infections last on avg 7-10 days with peak in symptoms on days 3-5. Recommend symptomatic treamtent with rest, fluids, good handwashing.  The following may also be helpful for symptom management:    >Cold-eeze (zinc gluconate lozenges)-> dissolve 1 lozenge in mouth every 2-4 hrs while awake which can be helpful in supporting your immune system in fighting cold. >Over the counter pain relievers acetaminophen and/or ibuprofen as needed and do not exceed daily amount printed on label. >Saltwater gargles, soft foods, cool liquids, over the counter cepacol throat lozenges as needed for sore throat. FOLLOW-UP:  Return if symptoms worsen or fail to improve.     In addition to other information, the printed after visit summary provided to the patient includes:  [x] COVID-19 General patient information

## 2022-07-27 NOTE — PATIENT INSTRUCTIONS
Rapid strep negative. COVID PCR pending. Viral upper respiratory infections last on avg 7-10 days with peak in symptoms on days 3-5. Recommend symptomatic treamtent with rest, fluids, good handwashing. The following may also be helpful for symptom management:    >Cold-eeze (zinc gluconate lozenges)-> dissolve 1 lozenge in mouth every 2-4 hrs while awake which can be helpful in supporting your immune system in fighting cold. >Over the counter pain relievers acetaminophen and/or ibuprofen as needed and do not exceed daily amount printed on label. >Saltwater gargles, soft foods, cool liquids, over the counter cepacol throat lozenges as needed for sore throat.

## 2022-07-28 DIAGNOSIS — J02.9 ACUTE PHARYNGITIS, UNSPECIFIED ETIOLOGY: Primary | ICD-10-CM

## 2022-07-28 LAB
SARS-COV-2: NOT DETECTED
SOURCE: NORMAL

## 2022-07-28 RX ORDER — PREDNISONE 20 MG/1
TABLET ORAL
Qty: 6 TABLET | Refills: 0 | Status: SHIPPED | OUTPATIENT
Start: 2022-07-28

## 2022-10-17 ENCOUNTER — OFFICE VISIT (OUTPATIENT)
Dept: FAMILY MEDICINE CLINIC | Age: 42
End: 2022-10-17
Payer: COMMERCIAL

## 2022-10-17 ENCOUNTER — HOSPITAL ENCOUNTER (OUTPATIENT)
Age: 42
Discharge: HOME | End: 2022-10-17
Payer: COMMERCIAL

## 2022-10-17 VITALS
WEIGHT: 137 LBS | SYSTOLIC BLOOD PRESSURE: 110 MMHG | TEMPERATURE: 98.1 F | DIASTOLIC BLOOD PRESSURE: 62 MMHG | HEART RATE: 86 BPM | BODY MASS INDEX: 22.02 KG/M2 | HEIGHT: 66 IN | OXYGEN SATURATION: 98 %

## 2022-10-17 DIAGNOSIS — Z13.1 SCREENING FOR DIABETES MELLITUS: ICD-10-CM

## 2022-10-17 DIAGNOSIS — Z00.00 WELLNESS EXAMINATION: Primary | ICD-10-CM

## 2022-10-17 DIAGNOSIS — Z13.220 LIPID SCREENING: ICD-10-CM

## 2022-10-17 LAB
CHOLESTEROL: 188 MG/DL
GLUCOSE FASTING: 87 MG/DL (ref 70–99)
HDLC SERPL-MCNC: 65 MG/DL
LDL CHOLESTEROL CALCULATED: 109 MG/DL
TRIGL SERPL-MCNC: 68 MG/DL

## 2022-10-17 PROCEDURE — 99396 PREV VISIT EST AGE 40-64: CPT | Performed by: NURSE PRACTITIONER

## 2022-10-17 PROCEDURE — 82947 ASSAY GLUCOSE BLOOD QUANT: CPT

## 2022-10-17 PROCEDURE — 36415 COLL VENOUS BLD VENIPUNCTURE: CPT

## 2022-10-17 PROCEDURE — 80061 LIPID PANEL: CPT

## 2022-10-17 ASSESSMENT — ENCOUNTER SYMPTOMS
WHEEZING: 0
SHORTNESS OF BREATH: 0
ABDOMINAL PAIN: 0
RHINORRHEA: 0
NAUSEA: 0
CONSTIPATION: 0
SORE THROAT: 0
SINUS PAIN: 0
COUGH: 0
VOMITING: 0
DIARRHEA: 0
SINUS PRESSURE: 0
CHEST TIGHTNESS: 0

## 2022-10-17 NOTE — PROGRESS NOTES
Elizabeth Davis   38 y.o.  female  [id number]      Chief Complaint   Patient presents with    Annual Exam     Wellness Physical for [facility]. Subjective:  38 y. o.female is here for a follow up. She has the following chronic/acute medical problems:  Patient Active Problem List   Diagnosis    IBS (irritable bowel syndrome)    Anxiety disorder    Depression    Essential hypertension       Patient is here with for annual physical for insurance. Review of Systems   Constitutional:  Negative for appetite change, chills, fatigue and fever. HENT:  Negative for congestion, ear pain, postnasal drip, rhinorrhea, sinus pressure, sinus pain, sneezing and sore throat. Respiratory:  Negative for cough, chest tightness, shortness of breath and wheezing. Cardiovascular:  Negative for chest pain and palpitations. Gastrointestinal:  Negative for abdominal pain, constipation, diarrhea, nausea and vomiting. Endocrine: Negative for cold intolerance, heat intolerance, polydipsia, polyphagia and polyuria. Genitourinary:  Negative for difficulty urinating, dysuria, frequency, hematuria and urgency. Skin:  Negative for rash. Neurological:  Negative for dizziness, light-headedness and headaches. Psychiatric/Behavioral:  Negative for dysphoric mood, self-injury, sleep disturbance and suicidal ideas. The patient is nervous/anxious. Current Outpatient Medications   Medication Sig Dispense Refill    buPROPion (WELLBUTRIN XL) 150 MG extended release tablet Take 1 tablet by mouth every morning 30 tablet 5    losartan (COZAAR) 50 mg tablet Take 1 tablet by mouth daily 30 tablet 5    famciclovir (FAMVIR) 500 MG tablet Take three tablets for one dose as needed. 9 tablet 5    metroNIDAZOLE (METROCREAM) 0.75 % cream Apply topically 2 times daily to face 45 g 0    ibuprofen (ADVIL;MOTRIN) 600 MG tablet Take 1 tablet by mouth every 8 hours as needed for Pain.  60 tablet 0    multivitamin (THERAGRAN) per tablet Take 1 tablet by mouth 2 times daily. predniSONE (DELTASONE) 20 MG tablet Take 2 tablets in the morning with breakfast x 3 days. (Patient not taking: Reported on 10/17/2022) 6 tablet 0    fluconazole (DIFLUCAN) 150 MG tablet Take one tablet by mouth now, repeat in 3 days if symptoms persist (Patient not taking: Reported on 10/17/2022) 2 tablet 0     No current facility-administered medications for this visit. Past medical, family,surgical history reviewed today. Objective:  /62   Pulse 86   Temp 98.1 °F (36.7 °C)   Ht 5' 5.75\" (1.67 m)   Wt 137 lb (62.1 kg) Comment: has lost weight  SpO2 98%   BMI 22.28 kg/m²   BP Readings from Last 3 Encounters:   10/17/22 110/62   06/07/22 118/64   01/25/22 112/60     Wt Readings from Last 3 Encounters:   10/17/22 137 lb (62.1 kg)   06/07/22 169 lb (76.7 kg)   01/25/22 169 lb (76.7 kg)         Physical Exam  Constitutional:       Appearance: Normal appearance. HENT:      Head: Normocephalic. Right Ear: Tympanic membrane, ear canal and external ear normal.      Left Ear: Tympanic membrane, ear canal and external ear normal.      Nose: Nose normal.      Mouth/Throat:      Lips: Pink. Mouth: Mucous membranes are moist.      Pharynx: Oropharynx is clear. Eyes:      Pupils: Pupils are equal, round, and reactive to light. Cardiovascular:      Rate and Rhythm: Normal rate and regular rhythm. Heart sounds: Normal heart sounds. Pulmonary:      Effort: Pulmonary effort is normal.      Breath sounds: Normal breath sounds. Abdominal:      General: Bowel sounds are normal.      Palpations: Abdomen is soft. Tenderness: There is no abdominal tenderness. Musculoskeletal:      Cervical back: Neck supple. Right lower leg: No edema. Left lower leg: No edema. Skin:     General: Skin is warm and dry. Neurological:      Mental Status: She is alert and oriented to person, place, and time.       Cranial Nerves: Cranial nerves 2-12 are intact. Sensory: Sensation is intact. Motor: Motor function is intact. Coordination: Coordination is intact. Gait: Gait is intact. Deep Tendon Reflexes:      Reflex Scores:       Patellar reflexes are 2+ on the right side and 2+ on the left side. Psychiatric:         Mood and Affect: Mood normal.         Behavior: Behavior normal.       Lab Results   Component Value Date    WBC 8.5 12/15/2021    HGB 13.9 12/15/2021    HCT 41.1 12/15/2021    MCV 94 12/15/2021     12/15/2021     Lab Results   Component Value Date     12/15/2021    K 4.5 12/15/2021     12/15/2021    CO2 24 12/15/2021    BUN 13 12/15/2021    CREATININE 0.78 12/15/2021    GLUCOSE 87 12/15/2021    CALCIUM 9.2 12/15/2021    PROT 7.5 12/15/2021    LABALBU 4.5 12/15/2021    BILITOT 0.4 12/15/2021    ALKPHOS 69 12/15/2021    AST 22 12/15/2021    ALT 15 12/15/2021    LABGLOM 96 12/15/2021    GFRAA 111 12/15/2021    AGRATIO 1.5 12/15/2021    GLOB 3.0 12/15/2021     Lab Results   Component Value Date    CHOL 188 09/24/2020    CHOL 197 03/12/2019    CHOL 182 02/12/2018     Lab Results   Component Value Date    TRIG 53 09/24/2020    TRIG 86 03/12/2019    TRIG 66 02/12/2018     Lab Results   Component Value Date    HDL 87 (H) 09/24/2020    HDL 76 (H) 03/12/2019    HDL 76 (H) 02/12/2018     Lab Results   Component Value Date    LDLCALC 90 09/24/2020    LDLCALC 104 (H) 03/12/2019    LDLCALC 93 02/12/2018     Lab Results   Component Value Date    LABA1C 4.7 06/07/2022     Lab Results   Component Value Date    TSHFT4 2.20 06/07/2022    TSH 1.060 12/15/2021    TSHHS 1.470 08/07/2014         ASSESSMENT/PLAN:      1. Wellness examination      2. Lipid screening  - LIPID PANEL; Future    3. Screening for diabetes mellitus  - Glucose, Fasting; Future      No orders of the defined types were placed in this encounter. There are no discontinued medications. Care discussed with patient. Questions answered.  Patient verbalizes understanding and agrees with plan. After visit summary provided. Advised to call for any problems, questions, or concerns. Return if symptoms worsen or fail to improve.                                              Signed:  CECI Beaver CNP  10/17/22  9:12 AM

## 2022-11-04 DIAGNOSIS — F32.A DEPRESSION, UNSPECIFIED DEPRESSION TYPE: ICD-10-CM

## 2022-11-04 RX ORDER — BUPROPION HYDROCHLORIDE 300 MG/1
300 TABLET ORAL EVERY MORNING
Qty: 30 TABLET | Refills: 0 | Status: SHIPPED | OUTPATIENT
Start: 2022-11-04 | End: 2022-11-08 | Stop reason: SDUPTHER

## 2022-11-04 RX ORDER — BUPROPION HYDROCHLORIDE 150 MG/1
150 TABLET ORAL EVERY MORNING
Qty: 30 TABLET | Refills: 5 | Status: CANCELLED | OUTPATIENT
Start: 2022-11-04

## 2022-11-08 ENCOUNTER — OFFICE VISIT (OUTPATIENT)
Dept: INTERNAL MEDICINE CLINIC | Age: 42
End: 2022-11-08
Payer: COMMERCIAL

## 2022-11-08 VITALS
HEIGHT: 66 IN | SYSTOLIC BLOOD PRESSURE: 114 MMHG | DIASTOLIC BLOOD PRESSURE: 78 MMHG | OXYGEN SATURATION: 99 % | WEIGHT: 137 LBS | BODY MASS INDEX: 22.02 KG/M2 | HEART RATE: 80 BPM

## 2022-11-08 DIAGNOSIS — F32.A DEPRESSION, UNSPECIFIED DEPRESSION TYPE: Primary | ICD-10-CM

## 2022-11-08 DIAGNOSIS — L73.1 INGROWN HAIR: ICD-10-CM

## 2022-11-08 DIAGNOSIS — L73.9 HAIR FOLLICLE INFECTION: ICD-10-CM

## 2022-11-08 PROCEDURE — 99213 OFFICE O/P EST LOW 20 MIN: CPT | Performed by: FAMILY MEDICINE

## 2022-11-08 PROCEDURE — 3078F DIAST BP <80 MM HG: CPT | Performed by: FAMILY MEDICINE

## 2022-11-08 PROCEDURE — 3074F SYST BP LT 130 MM HG: CPT | Performed by: FAMILY MEDICINE

## 2022-11-08 RX ORDER — DOXYCYCLINE HYCLATE 100 MG
100 TABLET ORAL 2 TIMES DAILY
Qty: 20 TABLET | Refills: 0 | Status: SHIPPED | OUTPATIENT
Start: 2022-11-08 | End: 2022-11-18

## 2022-11-08 RX ORDER — FLUCONAZOLE 150 MG/1
150 TABLET ORAL
Qty: 2 TABLET | Refills: 0 | Status: SHIPPED | OUTPATIENT
Start: 2022-11-08 | End: 2022-11-14

## 2022-11-08 RX ORDER — BUPROPION HYDROCHLORIDE 300 MG/1
300 TABLET ORAL EVERY MORNING
Qty: 30 TABLET | Refills: 5 | Status: SHIPPED | OUTPATIENT
Start: 2022-11-08

## 2022-11-08 SDOH — ECONOMIC STABILITY: FOOD INSECURITY: WITHIN THE PAST 12 MONTHS, YOU WORRIED THAT YOUR FOOD WOULD RUN OUT BEFORE YOU GOT MONEY TO BUY MORE.: NEVER TRUE

## 2022-11-08 SDOH — ECONOMIC STABILITY: FOOD INSECURITY: WITHIN THE PAST 12 MONTHS, THE FOOD YOU BOUGHT JUST DIDN'T LAST AND YOU DIDN'T HAVE MONEY TO GET MORE.: NEVER TRUE

## 2022-11-08 ASSESSMENT — SOCIAL DETERMINANTS OF HEALTH (SDOH): HOW HARD IS IT FOR YOU TO PAY FOR THE VERY BASICS LIKE FOOD, HOUSING, MEDICAL CARE, AND HEATING?: NOT HARD AT ALL

## 2022-11-08 ASSESSMENT — ENCOUNTER SYMPTOMS
COUGH: 0
SHORTNESS OF BREATH: 0
ABDOMINAL PAIN: 0
NAUSEA: 0
BACK PAIN: 0

## 2022-11-08 NOTE — PROGRESS NOTES
Sonya Meléndez (:  1980) is a 38 y.o. female,established patient, here for evaluation of the following chief complaint(s):  Follow-up and Depression         ASSESSMENT/PLAN:  1. Depression, unspecified depression type  - buPROPion (WELLBUTRIN XL) 300 MG extended release tablet; Take 1 tablet by mouth every morning  Dispense: 30 tablet; Refill: 5    2. Ingrown hair  - doxycycline hyclate (VIBRA-TABS) 100 MG tablet; Take 1 tablet by mouth 2 times daily for 10 days  Dispense: 20 tablet; Refill: 0    3. Hair follicle infection  - doxycycline hyclate (VIBRA-TABS) 100 MG tablet; Take 1 tablet by mouth 2 times daily for 10 days  Dispense: 20 tablet; Refill: 0  ADR's explained    Persist RTO or call  Return in about 5 months (around 2023) for Depression. Lab Results   Component Value Date    WBC 8.5 12/15/2021    HGB 13.9 12/15/2021    HCT 41.1 12/15/2021    MCV 94 12/15/2021     12/15/2021       Lab Results   Component Value Date     12/15/2021    K 4.5 12/15/2021     12/15/2021    CO2 24 12/15/2021    BUN 13 12/15/2021    CREATININE 0.78 12/15/2021    GLUCOSE 87 12/15/2021    CALCIUM 9.2 12/15/2021    PROT 7.5 12/15/2021    LABALBU 4.5 12/15/2021    BILITOT 0.4 12/15/2021    ALKPHOS 69 12/15/2021    AST 22 12/15/2021    ALT 15 12/15/2021    LABGLOM 96 12/15/2021    GFRAA 111 12/15/2021    AGRATIO 1.5 12/15/2021    GLOB 3.0 12/15/2021     Lab Results   Component Value Date    TSHFT4 2.20 2022    TSH 1.060 12/15/2021         Subjective   SUBJECTIVE/OBJECTIVE:    HISTORY OF PRESENT ILLNESS:  This is a 38 y.o. female here for the following:  Patient Active Problem List    Diagnosis Date Noted    Depression 2021    Essential hypertension 2021    IBS (irritable bowel syndrome) 2014    Anxiety disorder 2014      HTN- Off losartan. She has lost weight and the Bp was lower  Patient  lost weight with APICBG.  She did well with the injections   Depression - on Wellbutrin  daily. This is working better for her. She is in counseling. Pt had to see Dr. Claus Louis- Pl. Surgeon. She was treated with doxycycline for  ingrown hairs and 'cyst' in the pubic region after waxing. She is out of the medication and c/o of similar     Review of Systems   Constitutional:  Negative for diaphoresis and fever. Respiratory:  Negative for cough and shortness of breath. Cardiovascular:  Negative for chest pain and palpitations. Gastrointestinal:  Negative for abdominal pain and nausea. Genitourinary:  Negative for difficulty urinating. Musculoskeletal:  Negative for back pain. Neurological:  Negative for dizziness and headaches. Allergies   Allergen Reactions    Sulfa Antibiotics      Current Outpatient Medications   Medication Sig Dispense Refill    buPROPion (WELLBUTRIN XL) 300 MG extended release tablet Take 1 tablet by mouth every morning 30 tablet 5    doxycycline hyclate (VIBRA-TABS) 100 MG tablet Take 1 tablet by mouth 2 times daily for 10 days 20 tablet 0    fluconazole (DIFLUCAN) 150 MG tablet Take 1 tablet by mouth every 72 hours for 6 days 2 tablet 0    famciclovir (FAMVIR) 500 MG tablet Take three tablets for one dose as needed. 9 tablet 5    metroNIDAZOLE (METROCREAM) 0.75 % cream Apply topically 2 times daily to face 45 g 0    ibuprofen (ADVIL;MOTRIN) 600 MG tablet Take 1 tablet by mouth every 8 hours as needed for Pain. 60 tablet 0    multivitamin (THERAGRAN) per tablet Take 1 tablet by mouth 2 times daily. No current facility-administered medications for this visit. Vitals:    11/08/22 1424   BP: 114/78   Site: Left Upper Arm   Position: Sitting   Cuff Size: Medium Adult   Pulse: 80   SpO2: 99%   Weight: 137 lb (62.1 kg)   Height: 5' 5.75\" (1.67 m)     Objective   Physical Exam  Vitals reviewed. Constitutional:       General: She is not in acute distress. Eyes:      Extraocular Movements: Extraocular movements intact.    Cardiovascular: Rate and Rhythm: Normal rate and regular rhythm. Pulmonary:      Effort: Pulmonary effort is normal. No respiratory distress. Breath sounds: Normal breath sounds. Abdominal:      Palpations: Abdomen is soft. Tenderness: There is no abdominal tenderness. Musculoskeletal:      Cervical back: Neck supple. Right lower leg: No edema. Left lower leg: No edema. Neurological:      Mental Status: She is alert and oriented to person, place, and time. Psychiatric:         Mood and Affect: Mood normal.              An electronic signature was used to authenticate this note. --Criss Hargrove DO     This dictation was generated by voice recognition computer software. Although all attempts are made to edit the dictation for accuracy, there may be errors in the transcription that are not intended.

## 2023-01-05 LAB — MAMMOGRAPHY, EXTERNAL: NORMAL

## 2023-01-06 ENCOUNTER — TELEPHONE (OUTPATIENT)
Dept: INTERNAL MEDICINE CLINIC | Age: 43
End: 2023-01-06

## 2023-01-06 NOTE — TELEPHONE ENCOUNTER
Called pt. Pt did a walk in mammo. There was some abnormalities on her mammo. An order request was sent through fax.      Order has been faxed

## 2023-01-06 NOTE — TELEPHONE ENCOUNTER
I am not sure what she is talking about. This patient has a Gyn, did they mean to call her. I am unaware of this order or why.

## 2023-01-09 LAB — MAMMOGRAPHY, EXTERNAL: NORMAL

## 2023-03-10 RX ORDER — FAMCICLOVIR 500 MG/1
TABLET ORAL
Qty: 9 TABLET | Refills: 5 | Status: SHIPPED | OUTPATIENT
Start: 2023-03-10

## 2023-05-09 ENCOUNTER — OFFICE VISIT (OUTPATIENT)
Dept: OBGYN | Age: 43
End: 2023-05-09
Payer: COMMERCIAL

## 2023-05-09 VITALS
HEIGHT: 66 IN | DIASTOLIC BLOOD PRESSURE: 85 MMHG | HEART RATE: 79 BPM | BODY MASS INDEX: 22.5 KG/M2 | SYSTOLIC BLOOD PRESSURE: 126 MMHG | WEIGHT: 140 LBS

## 2023-05-09 DIAGNOSIS — Z01.419 WOMEN'S ANNUAL ROUTINE GYNECOLOGICAL EXAMINATION: Primary | ICD-10-CM

## 2023-05-09 DIAGNOSIS — R92.1 BREAST CALCIFICATIONS ON MAMMOGRAM: ICD-10-CM

## 2023-05-09 DIAGNOSIS — L72.3 SEBACEOUS CYST: ICD-10-CM

## 2023-05-09 PROCEDURE — 99396 PREV VISIT EST AGE 40-64: CPT | Performed by: NURSE PRACTITIONER

## 2023-05-09 PROCEDURE — 3074F SYST BP LT 130 MM HG: CPT | Performed by: NURSE PRACTITIONER

## 2023-05-09 PROCEDURE — 3079F DIAST BP 80-89 MM HG: CPT | Performed by: NURSE PRACTITIONER

## 2023-05-09 RX ORDER — CLINDAMYCIN PHOSPHATE 10 MG/G
GEL TOPICAL
Qty: 60 G | Refills: 0 | Status: SHIPPED | OUTPATIENT
Start: 2023-05-09 | End: 2023-05-16

## 2023-05-09 SDOH — ECONOMIC STABILITY: FOOD INSECURITY: WITHIN THE PAST 12 MONTHS, THE FOOD YOU BOUGHT JUST DIDN'T LAST AND YOU DIDN'T HAVE MONEY TO GET MORE.: NEVER TRUE

## 2023-05-09 SDOH — ECONOMIC STABILITY: FOOD INSECURITY: WITHIN THE PAST 12 MONTHS, YOU WORRIED THAT YOUR FOOD WOULD RUN OUT BEFORE YOU GOT MONEY TO BUY MORE.: NEVER TRUE

## 2023-05-09 SDOH — ECONOMIC STABILITY: HOUSING INSECURITY
IN THE LAST 12 MONTHS, WAS THERE A TIME WHEN YOU DID NOT HAVE A STEADY PLACE TO SLEEP OR SLEPT IN A SHELTER (INCLUDING NOW)?: NO

## 2023-05-09 SDOH — ECONOMIC STABILITY: INCOME INSECURITY: HOW HARD IS IT FOR YOU TO PAY FOR THE VERY BASICS LIKE FOOD, HOUSING, MEDICAL CARE, AND HEATING?: NOT HARD AT ALL

## 2023-05-09 ASSESSMENT — PATIENT HEALTH QUESTIONNAIRE - PHQ9
7. TROUBLE CONCENTRATING ON THINGS, SUCH AS READING THE NEWSPAPER OR WATCHING TELEVISION: 0
SUM OF ALL RESPONSES TO PHQ QUESTIONS 1-9: 0
10. IF YOU CHECKED OFF ANY PROBLEMS, HOW DIFFICULT HAVE THESE PROBLEMS MADE IT FOR YOU TO DO YOUR WORK, TAKE CARE OF THINGS AT HOME, OR GET ALONG WITH OTHER PEOPLE: 0
5. POOR APPETITE OR OVEREATING: 0
2. FEELING DOWN, DEPRESSED OR HOPELESS: 0
9. THOUGHTS THAT YOU WOULD BE BETTER OFF DEAD, OR OF HURTING YOURSELF: 0
6. FEELING BAD ABOUT YOURSELF - OR THAT YOU ARE A FAILURE OR HAVE LET YOURSELF OR YOUR FAMILY DOWN: 0
3. TROUBLE FALLING OR STAYING ASLEEP: 0
4. FEELING TIRED OR HAVING LITTLE ENERGY: 0
8. MOVING OR SPEAKING SO SLOWLY THAT OTHER PEOPLE COULD HAVE NOTICED. OR THE OPPOSITE, BEING SO FIGETY OR RESTLESS THAT YOU HAVE BEEN MOVING AROUND A LOT MORE THAN USUAL: 0
SUM OF ALL RESPONSES TO PHQ QUESTIONS 1-9: 0

## 2023-05-09 ASSESSMENT — ENCOUNTER SYMPTOMS
RESPIRATORY NEGATIVE: 1
ABDOMINAL PAIN: 0
GASTROINTESTINAL NEGATIVE: 1
CONSTIPATION: 0
VOMITING: 0
SHORTNESS OF BREATH: 0
NAUSEA: 0
DIARRHEA: 0

## 2023-05-09 NOTE — PROGRESS NOTES
5/9/23    Larry Samuel  1980    Chief Complaint   Patient presents with    New Patient     Pt here for annual, is sexually active, had hyster without ovary removal, hrt-none, pap-unsure-neg, mammo-1/23-BI-RADS 3, probably benign, repeat in 6 months. No c/o today. The patient is a 41 y.o. female, George Pepe who presents for her annual exam. She is not menopausal . She reports additional symptoms of recurrent cysts over mons (was taking Doxycycline QD)  She has had a hysterectomy without removal of ovaries. She is  sexually active. Pap smear history: Her last PAP smear was in prior to hysterectomy. Her results were normal.    Breast history: her most recent mammogram was in 1/2023. The results were: abnormal; L breast calcifications, return for images June-July 2023    Past Medical History:   Diagnosis Date    Anxiety     Depression     Dysmenorrhea     Endometriosis     Hypertension     IBS (irritable bowel syndrome)     Ovarian cyst     Rosacea        Past Surgical History:   Procedure Laterality Date    BREAST SURGERY      HYSTERECTOMY (CERVIX STATUS UNKNOWN)      partial; still has ovaries    TUBAL LIGATION      WISDOM TOOTH EXTRACTION         Family History   Problem Relation Age of Onset    Diabetes Maternal Grandmother     Mental Illness Maternal Grandmother     Hypertension Mother     Other Mother         endometriosis, cyst       Social History     Tobacco Use    Smoking status: Former    Smokeless tobacco: Never   Vaping Use    Vaping Use: Never used   Substance Use Topics    Alcohol use: Yes     Comment: socially    Drug use: Not Currently       Current Outpatient Medications   Medication Sig Dispense Refill    clindamycin (CLEOCIN-T) 1 % gel Apply topically 2 times daily.  60 g 0    famciclovir (FAMVIR) 500 MG tablet take three tablets by mouth for 1 dose as needed 9 tablet 5    buPROPion (WELLBUTRIN XL) 300 MG extended release tablet Take 1 tablet by mouth every morning 30 tablet 5

## 2023-06-19 DIAGNOSIS — F32.A DEPRESSION, UNSPECIFIED DEPRESSION TYPE: ICD-10-CM

## 2023-06-19 LAB — MAMMOGRAPHY, EXTERNAL: NORMAL

## 2023-06-19 RX ORDER — BUPROPION HYDROCHLORIDE 300 MG/1
300 TABLET ORAL EVERY MORNING
Qty: 30 TABLET | Refills: 2 | Status: SHIPPED | OUTPATIENT
Start: 2023-06-19

## 2023-10-02 DIAGNOSIS — F32.A DEPRESSION, UNSPECIFIED DEPRESSION TYPE: ICD-10-CM

## 2023-10-02 RX ORDER — BUPROPION HYDROCHLORIDE 300 MG/1
300 TABLET ORAL EVERY MORNING
Qty: 30 TABLET | Refills: 0 | OUTPATIENT
Start: 2023-10-02

## 2023-10-03 DIAGNOSIS — F32.A DEPRESSION, UNSPECIFIED DEPRESSION TYPE: ICD-10-CM

## 2023-10-03 RX ORDER — BUPROPION HYDROCHLORIDE 300 MG/1
300 TABLET ORAL EVERY MORNING
Qty: 30 TABLET | Refills: 0 | OUTPATIENT
Start: 2023-10-03

## 2023-10-07 DIAGNOSIS — F32.A DEPRESSION, UNSPECIFIED DEPRESSION TYPE: ICD-10-CM

## 2023-10-09 RX ORDER — BUPROPION HYDROCHLORIDE 300 MG/1
300 TABLET ORAL EVERY MORNING
Qty: 30 TABLET | Refills: 0 | Status: SHIPPED | OUTPATIENT
Start: 2023-10-09

## 2023-10-23 ENCOUNTER — OFFICE VISIT (OUTPATIENT)
Dept: FAMILY MEDICINE CLINIC | Age: 43
End: 2023-10-23
Payer: COMMERCIAL

## 2023-10-23 VITALS
WEIGHT: 138.8 LBS | SYSTOLIC BLOOD PRESSURE: 108 MMHG | OXYGEN SATURATION: 98 % | DIASTOLIC BLOOD PRESSURE: 66 MMHG | HEIGHT: 66 IN | TEMPERATURE: 98 F | HEART RATE: 77 BPM | BODY MASS INDEX: 22.31 KG/M2

## 2023-10-23 DIAGNOSIS — Z13.1 DIABETES MELLITUS SCREENING: ICD-10-CM

## 2023-10-23 DIAGNOSIS — Z13.220 LIPID SCREENING: ICD-10-CM

## 2023-10-23 DIAGNOSIS — F32.A DEPRESSION, UNSPECIFIED DEPRESSION TYPE: ICD-10-CM

## 2023-10-23 DIAGNOSIS — Z00.00 WELLNESS EXAMINATION: Primary | ICD-10-CM

## 2023-10-23 PROCEDURE — 99396 PREV VISIT EST AGE 40-64: CPT | Performed by: NURSE PRACTITIONER

## 2023-10-23 PROCEDURE — 3078F DIAST BP <80 MM HG: CPT | Performed by: NURSE PRACTITIONER

## 2023-10-23 PROCEDURE — 3074F SYST BP LT 130 MM HG: CPT | Performed by: NURSE PRACTITIONER

## 2023-10-23 RX ORDER — BUPROPION HYDROCHLORIDE 300 MG/1
300 TABLET ORAL EVERY MORNING
Qty: 30 TABLET | Refills: 1 | Status: SHIPPED | OUTPATIENT
Start: 2023-10-23

## 2023-10-23 ASSESSMENT — ENCOUNTER SYMPTOMS
NAUSEA: 0
VOMITING: 0
CHEST TIGHTNESS: 0
ABDOMINAL PAIN: 0
CONSTIPATION: 0
SORE THROAT: 0
SINUS PRESSURE: 0
RHINORRHEA: 0
WHEEZING: 0
COUGH: 0
SHORTNESS OF BREATH: 0
BACK PAIN: 0
SINUS PAIN: 0
DIARRHEA: 0

## 2023-10-23 NOTE — PROGRESS NOTES
Affect: Mood normal.         Behavior: Behavior normal.         Lab Results   Component Value Date    WBC 8.5 12/15/2021    HGB 13.9 12/15/2021    HCT 41.1 12/15/2021    MCV 94 12/15/2021     12/15/2021     Lab Results   Component Value Date     12/15/2021    K 4.5 12/15/2021     12/15/2021    CO2 24 12/15/2021    BUN 13 12/15/2021    CREATININE 0.78 12/15/2021    GLUCOSE 87 12/15/2021    CALCIUM 9.2 12/15/2021    PROT 7.5 12/15/2021    LABALBU 4.5 12/15/2021    BILITOT 0.4 12/15/2021    ALKPHOS 69 12/15/2021    AST 22 12/15/2021    ALT 15 12/15/2021    LABGLOM 96 12/15/2021    GFRAA 111 12/15/2021    AGRATIO 1.5 12/15/2021    GLOB 3.0 12/15/2021     Lab Results   Component Value Date    CHOL 188 10/17/2022    CHOL 188 09/24/2020    CHOL 197 03/12/2019     Lab Results   Component Value Date    TRIG 68 10/17/2022    TRIG 53 09/24/2020    TRIG 86 03/12/2019     Lab Results   Component Value Date    HDL 65 10/17/2022    HDL 87 (H) 09/24/2020    HDL 76 (H) 03/12/2019     Lab Results   Component Value Date    LDLCALC 109 (H) 10/17/2022    LDLCALC 90 09/24/2020    LDLCALC 104 (H) 03/12/2019     Lab Results   Component Value Date    LABA1C 4.7 06/07/2022     Lab Results   Component Value Date    TSHFT4 2.20 06/07/2022    TSH 1.060 12/15/2021    TSHHS 1.470 08/07/2014         ASSESSMENT/PLAN:      1. Wellness examination    2. Lipid screening  - LIPID PANEL; Future    3. Diabetes mellitus screening  - Glucose, Fasting; Future    4. Depression, unspecified depression type  Stable. Continue Wellbutrin  mg Q morning.   - buPROPion (WELLBUTRIN XL) 300 MG extended release tablet; Take 1 tablet by mouth every morning  Dispense: 30 tablet; Refill: 1          Medications Discontinued During This Encounter   Medication Reason    buPROPion (WELLBUTRIN XL) 300 MG extended release tablet [address] discussed with patient. Questions answered. Patient verbalizes understanding and agrees with plan.

## 2023-10-24 LAB
CHOLESTEROL, TOTAL: 215 MG/DL
CHOLESTEROL/HDL RATIO: ABNORMAL
GLUCOSE BLD-MCNC: 84 MG/DL
HDLC SERPL-MCNC: 102 MG/DL (ref 35–70)
LDL CHOLESTEROL CALCULATED: 100 MG/DL (ref 0–160)
NONHDLC SERPL-MCNC: ABNORMAL MG/DL
TRIGL SERPL-MCNC: 63 MG/DL
VLDLC SERPL CALC-MCNC: 13 MG/DL

## 2023-10-26 ENCOUNTER — TELEPHONE (OUTPATIENT)
Dept: FAMILY MEDICINE CLINIC | Age: 43
End: 2023-10-26

## 2023-10-26 NOTE — TELEPHONE ENCOUNTER
Called and spoke with patient, advised her of results.  Wellness form scanned in and patient will  originals

## 2023-10-26 NOTE — TELEPHONE ENCOUNTER
Let patient know that her form is ready for . Let patient know that her cholesterol is elevated at 215 - would like this be below 200. LDL is 100 like that to be below 100. Would recommend a low cholesterol diet and then have this redrawn in 3 months by PCP.

## 2024-01-22 DIAGNOSIS — F32.A DEPRESSION, UNSPECIFIED DEPRESSION TYPE: ICD-10-CM

## 2024-01-22 RX ORDER — BUPROPION HYDROCHLORIDE 300 MG/1
300 TABLET ORAL EVERY MORNING
Qty: 30 TABLET | Refills: 0 | OUTPATIENT
Start: 2024-01-22

## 2024-01-23 ENCOUNTER — OFFICE VISIT (OUTPATIENT)
Dept: INTERNAL MEDICINE CLINIC | Age: 44
End: 2024-01-23
Payer: COMMERCIAL

## 2024-01-23 VITALS
HEIGHT: 66 IN | WEIGHT: 140 LBS | SYSTOLIC BLOOD PRESSURE: 112 MMHG | BODY MASS INDEX: 22.5 KG/M2 | HEART RATE: 71 BPM | OXYGEN SATURATION: 99 % | DIASTOLIC BLOOD PRESSURE: 62 MMHG

## 2024-01-23 DIAGNOSIS — L71.9 ROSACEA: ICD-10-CM

## 2024-01-23 DIAGNOSIS — F32.A DEPRESSION, UNSPECIFIED DEPRESSION TYPE: ICD-10-CM

## 2024-01-23 PROCEDURE — 3074F SYST BP LT 130 MM HG: CPT | Performed by: FAMILY MEDICINE

## 2024-01-23 PROCEDURE — 3078F DIAST BP <80 MM HG: CPT | Performed by: FAMILY MEDICINE

## 2024-01-23 PROCEDURE — 99214 OFFICE O/P EST MOD 30 MIN: CPT | Performed by: FAMILY MEDICINE

## 2024-01-23 RX ORDER — BUPROPION HYDROCHLORIDE 300 MG/1
300 TABLET ORAL EVERY MORNING
Qty: 90 TABLET | Refills: 3 | Status: SHIPPED | OUTPATIENT
Start: 2024-01-23

## 2024-01-23 ASSESSMENT — PATIENT HEALTH QUESTIONNAIRE - PHQ9
5. POOR APPETITE OR OVEREATING: 0
2. FEELING DOWN, DEPRESSED OR HOPELESS: 0
3. TROUBLE FALLING OR STAYING ASLEEP: 0
SUM OF ALL RESPONSES TO PHQ QUESTIONS 1-9: 0
7. TROUBLE CONCENTRATING ON THINGS, SUCH AS READING THE NEWSPAPER OR WATCHING TELEVISION: 0
6. FEELING BAD ABOUT YOURSELF - OR THAT YOU ARE A FAILURE OR HAVE LET YOURSELF OR YOUR FAMILY DOWN: 0
SUM OF ALL RESPONSES TO PHQ9 QUESTIONS 1 & 2: 0
SUM OF ALL RESPONSES TO PHQ QUESTIONS 1-9: 0
1. LITTLE INTEREST OR PLEASURE IN DOING THINGS: 0
10. IF YOU CHECKED OFF ANY PROBLEMS, HOW DIFFICULT HAVE THESE PROBLEMS MADE IT FOR YOU TO DO YOUR WORK, TAKE CARE OF THINGS AT HOME, OR GET ALONG WITH OTHER PEOPLE: 0
SUM OF ALL RESPONSES TO PHQ QUESTIONS 1-9: 0
8. MOVING OR SPEAKING SO SLOWLY THAT OTHER PEOPLE COULD HAVE NOTICED. OR THE OPPOSITE, BEING SO FIGETY OR RESTLESS THAT YOU HAVE BEEN MOVING AROUND A LOT MORE THAN USUAL: 0
SUM OF ALL RESPONSES TO PHQ QUESTIONS 1-9: 0
9. THOUGHTS THAT YOU WOULD BE BETTER OFF DEAD, OR OF HURTING YOURSELF: 0
4. FEELING TIRED OR HAVING LITTLE ENERGY: 0

## 2024-01-23 ASSESSMENT — ENCOUNTER SYMPTOMS
ABDOMINAL PAIN: 0
NAUSEA: 0
COUGH: 0
SHORTNESS OF BREATH: 0

## 2024-01-23 NOTE — PROGRESS NOTES
Mikaela Avalos (:  1980) is a 44 y.o. female,established patient, here for evaluation of the following chief complaint(s):  Follow-up (Rosacea), Medication Refill, and Depression         ASSESSMENT/PLAN:  1. Rosacea  - metroNIDAZOLE (METROCREAM) 0.75 % cream; Apply topically 2 times daily to face  Dispense: 45 g; Refill: 1    2. Depression, unspecified depression type  - buPROPion (WELLBUTRIN XL) 300 MG extended release tablet; Take 1 tablet by mouth every morning  Dispense: 90 tablet; Refill: 3    Keep f/u with Gyn  Return for follow up in 1 year for annual and or depression.       Lab Results   Component Value Date    WBC 8.5 12/15/2021    HGB 13.9 12/15/2021    HCT 41.1 12/15/2021    MCV 94 12/15/2021     12/15/2021     Lab Results   Component Value Date    CHOL 215 10/24/2023     Lab Results   Component Value Date    TRIG 63 10/24/2023     Lab Results   Component Value Date     (A) 10/24/2023     Lab Results   Component Value Date    LDLCALC 100 10/24/2023    LDLDIRECT 112 (H) 2014     Lab Results   Component Value Date    LABA1C 4.7 2022     Lab Results   Component Value Date    EAG 88.2 2022     Lab Results   Component Value Date     12/15/2021    K 4.5 12/15/2021     12/15/2021    CO2 24 12/15/2021    BUN 13 12/15/2021    CREATININE 0.78 12/15/2021    GLUCOSE 84 10/24/2023    CALCIUM 9.2 12/15/2021    PROT 7.5 12/15/2021    LABALBU 4.5 12/15/2021    BILITOT 0.4 12/15/2021    ALKPHOS 69 12/15/2021    AST 22 12/15/2021    ALT 15 12/15/2021    LABGLOM 96 12/15/2021    GFRAA 111 12/15/2021    AGRATIO 1.5 12/15/2021    GLOB 3.0 12/15/2021     Lab Results   Component Value Date    TSHFT4 2.20 2022    TSH 1.060 12/15/2021     Lab Results   Component Value Date/Time    COLORU YELLOW 2019 10:43 AM    LABSPEC 1.010 2011 10:15 AM    LABPH 6.0 2015 05:15 PM    NITRU Negative 2019 10:43 AM    GLUCOSEU Negative 2019 10:43 AM    KETUA

## 2024-01-29 ENCOUNTER — TELEPHONE (OUTPATIENT)
Dept: INTERNAL MEDICINE CLINIC | Age: 44
End: 2024-01-29

## 2024-01-29 NOTE — TELEPHONE ENCOUNTER
[facility] called and stated that the buPROPion (WELLBUTRIN XL) 300 MG are on back order and wanted to see if 150mg 2 tabs in the morning was okay I told them that it was fine. I also called the Pt to let her know of this change she voiced understanding.

## 2024-02-19 LAB — MAMMOGRAPHY, EXTERNAL: NORMAL

## 2024-05-08 RX ORDER — FAMCICLOVIR 500 MG/1
TABLET ORAL
Qty: 9 TABLET | Refills: 5 | Status: SHIPPED | OUTPATIENT
Start: 2024-05-08

## 2024-10-28 ENCOUNTER — OFFICE VISIT (OUTPATIENT)
Dept: INTERNAL MEDICINE CLINIC | Age: 44
End: 2024-10-28

## 2024-10-28 VITALS
HEIGHT: 66 IN | DIASTOLIC BLOOD PRESSURE: 74 MMHG | WEIGHT: 136.6 LBS | OXYGEN SATURATION: 100 % | RESPIRATION RATE: 16 BRPM | BODY MASS INDEX: 21.95 KG/M2 | HEART RATE: 63 BPM | SYSTOLIC BLOOD PRESSURE: 104 MMHG

## 2024-10-28 DIAGNOSIS — Z13.220 SCREENING CHOLESTEROL LEVEL: ICD-10-CM

## 2024-10-28 DIAGNOSIS — Z00.00 ANNUAL PHYSICAL EXAM: Primary | ICD-10-CM

## 2024-10-28 DIAGNOSIS — I73.00 RAYNAUD'S DISEASE WITHOUT GANGRENE: ICD-10-CM

## 2024-10-28 DIAGNOSIS — R20.2 PARESTHESIA OF FINGER: ICD-10-CM

## 2024-10-28 DIAGNOSIS — L71.9 ROSACEA: ICD-10-CM

## 2024-10-28 DIAGNOSIS — F33.0 MILD EPISODE OF RECURRENT MAJOR DEPRESSIVE DISORDER (HCC): ICD-10-CM

## 2024-10-28 LAB
BASOPHILS # BLD: 0 K/UL (ref 0–0.2)
BASOPHILS NFR BLD: 0.4 %
DEPRECATED RDW RBC AUTO: 12.6 % (ref 12.4–15.4)
EOSINOPHIL # BLD: 0.2 K/UL (ref 0–0.6)
EOSINOPHIL NFR BLD: 2.1 %
HCT VFR BLD AUTO: 40.4 % (ref 36–48)
HGB BLD-MCNC: 13.6 G/DL (ref 12–16)
LYMPHOCYTES # BLD: 1.9 K/UL (ref 1–5.1)
LYMPHOCYTES NFR BLD: 24.9 %
MCH RBC QN AUTO: 32.7 PG (ref 26–34)
MCHC RBC AUTO-ENTMCNC: 33.5 G/DL (ref 31–36)
MCV RBC AUTO: 97.5 FL (ref 80–100)
MONOCYTES # BLD: 0.5 K/UL (ref 0–1.3)
MONOCYTES NFR BLD: 6.8 %
NEUTROPHILS # BLD: 5 K/UL (ref 1.7–7.7)
NEUTROPHILS NFR BLD: 65.8 %
PLATELET # BLD AUTO: 319 K/UL (ref 135–450)
PMV BLD AUTO: 9.1 FL (ref 5–10.5)
RBC # BLD AUTO: 4.15 M/UL (ref 4–5.2)
WBC # BLD AUTO: 7.6 K/UL (ref 4–11)

## 2024-10-28 RX ORDER — DOXYCYCLINE HYCLATE 100 MG
100 TABLET ORAL 2 TIMES DAILY
Qty: 20 TABLET | Refills: 0 | Status: SHIPPED | OUTPATIENT
Start: 2024-10-28 | End: 2024-11-07

## 2024-10-28 ASSESSMENT — ENCOUNTER SYMPTOMS
COUGH: 0
BLOOD IN STOOL: 0
NAUSEA: 0
DIARRHEA: 0
CONSTIPATION: 0
SHORTNESS OF BREATH: 0
BACK PAIN: 0
ABDOMINAL PAIN: 0

## 2024-10-28 NOTE — PROGRESS NOTES
Well Adult Note  Name: Mikaela Avalos Today’s Date: 10/28/2024   MRN: [id number] Sex: Female   Age: 44 y.o. Ethnicity: Non- / Non    : 1980 Race: White (non-)      History:  Patient Active Problem List    Diagnosis Date Noted    Depression 2021    Essential hypertension 2021    IBS (irritable bowel syndrome) 2014    Anxiety disorder 2014       History of Present Illness  The patient is a 44-year-old female who presents today for an annual physical exam. She has known rosacea and depression.    She reports a flare-up of her rosacea, which is not responding well to MetroCream at this time.    Her depression is stable on Wellbutrin  mg.    She experiences extreme coldness in her fingers during cold weather, leading her to suspect Raynaud's disease. She also reports occasional numbness in her fingertips, but no ulceration. This is a new symptom for her.    She has not started any new medications recently.    Her last mammogram was on 2024. She has undergone a hysterectomy but still has her ovaries and is under the care of a gynecologist.      Review of Systems   Constitutional:  Negative for diaphoresis and fever.   HENT: Negative.     Eyes:  Negative for visual disturbance.   Respiratory:  Negative for cough and shortness of breath.    Cardiovascular:  Negative for chest pain and palpitations.   Gastrointestinal:  Negative for abdominal pain, blood in stool, constipation, diarrhea and nausea.   Genitourinary:  Negative for difficulty urinating and dysuria.   Musculoskeletal:  Negative for back pain.   Neurological:  Negative for dizziness, light-headedness and headaches.   Psychiatric/Behavioral:  Negative for dysphoric mood.        Allergies   Allergen Reactions    Sulfa Antibiotics          Prior to Visit Medications    Medication Sig Taking? Authorizing Provider   doxycycline hyclate (VIBRA-TABS) 100 MG tablet Take 1 tablet by mouth 2 times daily for 10

## 2024-10-29 LAB
ALBUMIN SERPL-MCNC: 4.4 G/DL (ref 3.4–5)
ALBUMIN/GLOB SERPL: 1.6 {RATIO} (ref 1.1–2.2)
ALP SERPL-CCNC: 67 U/L (ref 40–129)
ALT SERPL-CCNC: 30 U/L (ref 10–40)
ANA SER QL IA: NEGATIVE
ANION GAP SERPL CALCULATED.3IONS-SCNC: 12 MMOL/L (ref 3–16)
AST SERPL-CCNC: 31 U/L (ref 15–37)
BACTERIA URNS QL MICRO: ABNORMAL /HPF
BILIRUB SERPL-MCNC: 0.8 MG/DL (ref 0–1)
BILIRUB UR QL STRIP.AUTO: NEGATIVE
BUN SERPL-MCNC: 12 MG/DL (ref 7–20)
CALCIUM SERPL-MCNC: 9.5 MG/DL (ref 8.3–10.6)
CHLORIDE SERPL-SCNC: 107 MMOL/L (ref 99–110)
CHOLEST SERPL-MCNC: 223 MG/DL (ref 0–199)
CLARITY UR: CLEAR
CO2 SERPL-SCNC: 24 MMOL/L (ref 21–32)
COLOR UR: YELLOW
CREAT SERPL-MCNC: 0.7 MG/DL (ref 0.6–1.1)
EPI CELLS #/AREA URNS AUTO: 9 /HPF (ref 0–5)
ERYTHROCYTE [SEDIMENTATION RATE] IN BLOOD BY WESTERGREN METHOD: 11 MM/HR (ref 0–20)
GFR SERPLBLD CREATININE-BSD FMLA CKD-EPI: >90 ML/MIN/{1.73_M2}
GLUCOSE SERPL-MCNC: 77 MG/DL (ref 70–99)
GLUCOSE UR STRIP.AUTO-MCNC: NEGATIVE MG/DL
HDLC SERPL-MCNC: 91 MG/DL (ref 40–60)
HGB UR QL STRIP.AUTO: NEGATIVE
HYALINE CASTS #/AREA URNS AUTO: 0 /LPF (ref 0–8)
KETONES UR STRIP.AUTO-MCNC: ABNORMAL MG/DL
LDLC SERPL CALC-MCNC: 117 MG/DL
LEUKOCYTE ESTERASE UR QL STRIP.AUTO: ABNORMAL
NITRITE UR QL STRIP.AUTO: NEGATIVE
PH UR STRIP.AUTO: 5.5 [PH] (ref 5–8)
POTASSIUM SERPL-SCNC: 4.6 MMOL/L (ref 3.5–5.1)
PROT SERPL-MCNC: 7.1 G/DL (ref 6.4–8.2)
PROT UR STRIP.AUTO-MCNC: NEGATIVE MG/DL
RBC CLUMPS #/AREA URNS AUTO: 2 /HPF (ref 0–4)
SODIUM SERPL-SCNC: 143 MMOL/L (ref 136–145)
SP GR UR STRIP.AUTO: 1.02 (ref 1–1.03)
TRIGL SERPL-MCNC: 73 MG/DL (ref 0–150)
TSH SERPL DL<=0.005 MIU/L-ACNC: 1.05 UIU/ML (ref 0.27–4.2)
UA DIPSTICK W REFLEX MICRO PNL UR: YES
URN SPEC COLLECT METH UR: ABNORMAL
UROBILINOGEN UR STRIP-ACNC: 0.2 E.U./DL
VLDLC SERPL CALC-MCNC: 15 MG/DL
WBC #/AREA URNS AUTO: 4 /HPF (ref 0–5)

## 2024-10-31 ENCOUNTER — TELEPHONE (OUTPATIENT)
Dept: INTERNAL MEDICINE CLINIC | Age: 44
End: 2024-10-31

## 2024-10-31 NOTE — TELEPHONE ENCOUNTER
Patient called to see if physical form for her job is completed. Advised that the form could be on the doctor's desk and the patient requested the form be completed today.

## 2024-12-02 ENCOUNTER — PATIENT MESSAGE (OUTPATIENT)
Dept: INTERNAL MEDICINE CLINIC | Age: 44
End: 2024-12-02

## 2024-12-02 RX ORDER — FLUCONAZOLE 150 MG/1
150 TABLET ORAL
Qty: 2 TABLET | Refills: 0 | Status: SHIPPED | OUTPATIENT
Start: 2024-12-02 | End: 2024-12-08

## 2024-12-11 ENCOUNTER — OFFICE VISIT (OUTPATIENT)
Dept: OBGYN | Age: 44
End: 2024-12-11
Payer: COMMERCIAL

## 2024-12-11 VITALS
DIASTOLIC BLOOD PRESSURE: 78 MMHG | HEART RATE: 80 BPM | HEIGHT: 66 IN | WEIGHT: 141 LBS | BODY MASS INDEX: 22.66 KG/M2 | SYSTOLIC BLOOD PRESSURE: 137 MMHG

## 2024-12-11 DIAGNOSIS — N89.8 VAGINAL DISCHARGE: ICD-10-CM

## 2024-12-11 DIAGNOSIS — Z01.419 ENCOUNTER FOR ANNUAL ROUTINE GYNECOLOGICAL EXAMINATION: Primary | ICD-10-CM

## 2024-12-11 DIAGNOSIS — N63.22 MASS OF UPPER INNER QUADRANT OF LEFT BREAST: ICD-10-CM

## 2024-12-11 PROCEDURE — 3075F SYST BP GE 130 - 139MM HG: CPT

## 2024-12-11 PROCEDURE — 99396 PREV VISIT EST AGE 40-64: CPT

## 2024-12-11 PROCEDURE — 3078F DIAST BP <80 MM HG: CPT

## 2024-12-11 PROCEDURE — 99459 PELVIC EXAMINATION: CPT

## 2024-12-11 RX ORDER — DOXYCYCLINE 100 MG/1
100 CAPSULE ORAL 2 TIMES DAILY
COMMUNITY

## 2024-12-11 SDOH — ECONOMIC STABILITY: FOOD INSECURITY: WITHIN THE PAST 12 MONTHS, THE FOOD YOU BOUGHT JUST DIDN'T LAST AND YOU DIDN'T HAVE MONEY TO GET MORE.: NEVER TRUE

## 2024-12-11 SDOH — ECONOMIC STABILITY: FOOD INSECURITY: WITHIN THE PAST 12 MONTHS, YOU WORRIED THAT YOUR FOOD WOULD RUN OUT BEFORE YOU GOT MONEY TO BUY MORE.: NEVER TRUE

## 2024-12-11 SDOH — ECONOMIC STABILITY: INCOME INSECURITY: HOW HARD IS IT FOR YOU TO PAY FOR THE VERY BASICS LIKE FOOD, HOUSING, MEDICAL CARE, AND HEATING?: NOT HARD AT ALL

## 2024-12-11 ASSESSMENT — ENCOUNTER SYMPTOMS
DIARRHEA: 0
GASTROINTESTINAL NEGATIVE: 1
SHORTNESS OF BREATH: 0
ABDOMINAL PAIN: 0
RESPIRATORY NEGATIVE: 1
CHEST TIGHTNESS: 0
CONSTIPATION: 0
VOMITING: 0
NAUSEA: 0

## 2024-12-11 NOTE — PROGRESS NOTES
24    Mikaela Avalos  1980    Chief Complaint   Patient presents with    Gynecologic Exam     Pt here for annual, is sexually active, had hyster without ovary removal, hrt-none, pap-, mammo-2024-birads 3 probably benign, routine surveillance.     Vaginal Discharge     Pt c/o yellow vaginal discharge, itching and odor. x 2 wks        The patient is a 44 y.o. female,  who presents for her annual exam. She has had a hysterectomy without removal of ovaries. C/o yellow vaginal discharge with itching and odor. Started when she changed soaps. PCP called her in Diflucan, she took this with minimal relief.     She is sexually active.     Pap smear history: prior to hysterectomy.     Breast history: her most recent mammogram was in 2024.  The results were: Normal BIRADS 3, breast calcifications.     Past Medical History:   Diagnosis Date    Anxiety     Depression     Dysmenorrhea     Endometriosis     Hypertension     IBS (irritable bowel syndrome)     Ovarian cyst     Rosacea        Past Surgical History:   Procedure Laterality Date    BREAST SURGERY      HYSTERECTOMY (CERVIX STATUS UNKNOWN)      partial; still has ovaries    TUBAL LIGATION      WISDOM TOOTH EXTRACTION         Family History   Problem Relation Age of Onset    Hypertension Mother     Other Mother         A. flutter, endometriosis, cyst    Diabetes Maternal Grandmother     Mental Illness Maternal Grandmother        Social History     Tobacco Use    Smoking status: Former    Smokeless tobacco: Never   Vaping Use    Vaping status: Never Used   Substance Use Topics    Alcohol use: Yes     Comment: socially    Drug use: Not Currently       Current Outpatient Medications   Medication Sig Dispense Refill    doxycycline hyclate (VIBRAMYCIN) 100 MG capsule Take 1 capsule by mouth 2 times daily      famciclovir (FAMVIR) 500 MG tablet take three tablets by mouth for 1 dose as needed 9 tablet 5    metroNIDAZOLE (METROCREAM) 0.75 % cream Apply

## 2024-12-12 ENCOUNTER — PATIENT MESSAGE (OUTPATIENT)
Dept: OBGYN | Age: 44
End: 2024-12-12

## 2024-12-12 LAB
BV BACTERIA DNA VAG QL NAA+PROBE: DETECTED
C GLABRATA DNA VAG QL NAA+PROBE: ABNORMAL
C GLABRATA DNA VAG QL NAA+PROBE: NOT DETECTED
C KRUSEI DNA VAG QL NAA+PROBE: NOT DETECTED
CANDIDA DNA VAG QL NAA+PROBE: NOT DETECTED
T VAGINALIS DNA VAG QL NAA+PROBE: NOT DETECTED

## 2024-12-12 RX ORDER — METRONIDAZOLE 500 MG/1
500 TABLET ORAL 2 TIMES DAILY
Qty: 14 TABLET | Refills: 0 | Status: SHIPPED | OUTPATIENT
Start: 2024-12-12 | End: 2024-12-19

## 2024-12-13 RX ORDER — CLINDAMYCIN HYDROCHLORIDE 300 MG/1
300 CAPSULE ORAL 2 TIMES DAILY
Qty: 14 CAPSULE | Refills: 0 | Status: SHIPPED | OUTPATIENT
Start: 2024-12-13 | End: 2024-12-20

## 2024-12-17 DIAGNOSIS — N63.22 MASS OF UPPER INNER QUADRANT OF LEFT BREAST: ICD-10-CM

## 2025-01-08 ENCOUNTER — PATIENT MESSAGE (OUTPATIENT)
Dept: INTERNAL MEDICINE CLINIC | Age: 45
End: 2025-01-08

## 2025-01-08 DIAGNOSIS — L71.9 ROSACEA: Primary | ICD-10-CM

## 2025-01-08 RX ORDER — DOXYCYCLINE 100 MG/1
100 CAPSULE ORAL 2 TIMES DAILY
Qty: 20 CAPSULE | Refills: 0 | Status: SHIPPED | OUTPATIENT
Start: 2025-01-08

## 2025-02-10 ENCOUNTER — PATIENT MESSAGE (OUTPATIENT)
Dept: INTERNAL MEDICINE CLINIC | Age: 45
End: 2025-02-10

## 2025-02-10 DIAGNOSIS — F32.A DEPRESSION, UNSPECIFIED DEPRESSION TYPE: ICD-10-CM

## 2025-02-10 RX ORDER — BUPROPION HYDROCHLORIDE 300 MG/1
300 TABLET ORAL EVERY MORNING
Qty: 90 TABLET | Refills: 0 | Status: SHIPPED | OUTPATIENT
Start: 2025-02-10

## 2025-03-09 ENCOUNTER — PATIENT MESSAGE (OUTPATIENT)
Dept: INTERNAL MEDICINE CLINIC | Age: 45
End: 2025-03-09

## 2025-03-09 DIAGNOSIS — L71.9 ROSACEA: ICD-10-CM

## 2025-03-10 RX ORDER — DOXYCYCLINE 100 MG/1
100 CAPSULE ORAL 2 TIMES DAILY
Qty: 20 CAPSULE | Refills: 0 | Status: SHIPPED | OUTPATIENT
Start: 2025-03-10

## 2025-03-20 ENCOUNTER — RESULTS FOLLOW-UP (OUTPATIENT)
Dept: OBGYN | Age: 45
End: 2025-03-20

## 2025-06-11 DIAGNOSIS — F32.A DEPRESSION, UNSPECIFIED DEPRESSION TYPE: ICD-10-CM

## 2025-06-11 RX ORDER — BUPROPION HYDROCHLORIDE 300 MG/1
300 TABLET ORAL EVERY MORNING
Qty: 90 TABLET | Refills: 0 | OUTPATIENT
Start: 2025-06-11

## 2025-06-11 NOTE — TELEPHONE ENCOUNTER
[facility] message sent with instructions on self scheduling.   
See Pt message   
      TSH:    TSH   Date Value Ref Range Status   10/28/2024 1.05 0.27 - 4.20 uIU/mL Final     TSH Reflex FT4   Date Value Ref Range Status   06/07/2022 2.20 0.27 - 4.20 uIU/mL Final     TSH, High Sensitivity   Date Value Ref Range Status   08/07/2014 1.470 0.270 - 4.20 uIu/ml Final       UA:  Color, UA   Date Value Ref Range Status   10/28/2024 Yellow Straw/Yellow Final     Clarity, UA   Date Value Ref Range Status   10/28/2024 Clear Clear Final     Bilirubin, Urine   Date Value Ref Range Status   10/28/2024 Negative Negative Final     Ketones, Urine   Date Value Ref Range Status   10/28/2024 TRACE (A) Negative mg/dL Final     Blood, Urine   Date Value Ref Range Status   10/28/2024 Negative Negative Final     Protein, UA   Date Value Ref Range Status   10/28/2024 Negative Negative mg/dL Final     Urobilinogen, Urine   Date Value Ref Range Status   10/28/2024 0.2 <2.0 E.U./dL Final     Nitrite, Urine   Date Value Ref Range Status   10/28/2024 Negative Negative Final     Leukocyte Esterase, Urine   Date Value Ref Range Status   10/28/2024 TRACE (A) Negative Final     RBC, UA   Date Value Ref Range Status   10/28/2024 2 0 - 4 /HPF Final   01/20/2015 NO CELLS SEEN 0 - 6 /HPF Final     WBC, UA   Date Value Ref Range Status   10/28/2024 4 0 - 5 /HPF Final     Bacteria, UA   Date Value Ref Range Status   10/28/2024 2+ (A) None Seen /HPF Final       Patient Care Team:  Mila Ross DO as PCP - General (Family Medicine)  Mila Ross DO as PCP - Empaneled Provider  Tricia Roland APRN - CNP (Gynecology)  Castillo Dominguez MD (Pain Medicine)  Elisa Wylie MD (Anesthesiology)  Nii Judge MD as Consulting Physician (Obstetrics & Gynecology)     Requested Pharmacy____________________________________________________________________  [facility]

## 2025-06-12 DIAGNOSIS — F32.A DEPRESSION, UNSPECIFIED DEPRESSION TYPE: ICD-10-CM

## 2025-06-12 RX ORDER — BUPROPION HYDROCHLORIDE 300 MG/1
300 TABLET ORAL EVERY MORNING
Qty: 90 TABLET | Refills: 1 | Status: SHIPPED | OUTPATIENT
Start: 2025-06-12

## 2025-07-31 RX ORDER — FAMCICLOVIR 500 MG/1
TABLET ORAL
Qty: 9 TABLET | Refills: 1 | Status: SHIPPED | OUTPATIENT
Start: 2025-07-31

## 2025-07-31 NOTE — TELEPHONE ENCOUNTER
Last appointment: 10/28/2024  Return in about 6 months (around 4/28/2025) for Depression.  Next appointment: Visit date not found  Last refill: 05/08/2024  Sent [employer] message to schedule due/overdue appointment.

## 2025-08-12 ENCOUNTER — PATIENT MESSAGE (OUTPATIENT)
Dept: INTERNAL MEDICINE CLINIC | Age: 45
End: 2025-08-12